# Patient Record
Sex: FEMALE | Race: WHITE
[De-identification: names, ages, dates, MRNs, and addresses within clinical notes are randomized per-mention and may not be internally consistent; named-entity substitution may affect disease eponyms.]

---

## 2017-10-25 ENCOUNTER — HOSPITAL ENCOUNTER (EMERGENCY)
Dept: HOSPITAL 8 - ED | Age: 42
Discharge: HOME | End: 2017-10-25
Payer: MEDICAID

## 2017-10-25 VITALS — DIASTOLIC BLOOD PRESSURE: 78 MMHG | SYSTOLIC BLOOD PRESSURE: 115 MMHG

## 2017-10-25 VITALS — WEIGHT: 153.22 LBS | BODY MASS INDEX: 24.05 KG/M2 | HEIGHT: 67 IN

## 2017-10-25 DIAGNOSIS — F17.200: ICD-10-CM

## 2017-10-25 DIAGNOSIS — G43.011: Primary | ICD-10-CM

## 2017-10-25 PROCEDURE — 99284 EMERGENCY DEPT VISIT MOD MDM: CPT

## 2017-10-25 PROCEDURE — 96375 TX/PRO/DX INJ NEW DRUG ADDON: CPT

## 2017-10-25 PROCEDURE — 96361 HYDRATE IV INFUSION ADD-ON: CPT

## 2017-10-25 PROCEDURE — 96374 THER/PROPH/DIAG INJ IV PUSH: CPT

## 2017-10-25 PROCEDURE — 96372 THER/PROPH/DIAG INJ SC/IM: CPT

## 2018-05-31 ENCOUNTER — HOSPITAL ENCOUNTER (OUTPATIENT)
Dept: HOSPITAL 8 - STAR | Age: 43
End: 2018-05-31
Attending: SURGERY
Payer: MEDICAID

## 2018-05-31 DIAGNOSIS — Z02.9: Primary | ICD-10-CM

## 2018-06-04 ENCOUNTER — HOSPITAL ENCOUNTER (OUTPATIENT)
Dept: HOSPITAL 8 - OUT | Age: 43
End: 2018-06-04
Attending: SURGERY
Payer: MEDICAID

## 2018-06-04 VITALS — WEIGHT: 158.73 LBS | BODY MASS INDEX: 24.91 KG/M2 | HEIGHT: 67 IN

## 2018-06-04 VITALS — SYSTOLIC BLOOD PRESSURE: 121 MMHG | DIASTOLIC BLOOD PRESSURE: 87 MMHG

## 2018-06-04 DIAGNOSIS — G43.909: ICD-10-CM

## 2018-06-04 DIAGNOSIS — Z98.890: ICD-10-CM

## 2018-06-04 DIAGNOSIS — F17.210: ICD-10-CM

## 2018-06-04 DIAGNOSIS — K43.2: Primary | ICD-10-CM

## 2018-06-04 DIAGNOSIS — Z88.5: ICD-10-CM

## 2018-06-04 DIAGNOSIS — F32.9: ICD-10-CM

## 2018-06-04 DIAGNOSIS — Z72.89: ICD-10-CM

## 2018-06-04 DIAGNOSIS — Z88.8: ICD-10-CM

## 2018-06-04 LAB — HCG UR SG: 1.02 (ref 1–1.03)

## 2018-06-04 PROCEDURE — 49568: CPT

## 2018-06-04 PROCEDURE — C1781 MESH (IMPLANTABLE): HCPCS

## 2018-06-04 PROCEDURE — 81025 URINE PREGNANCY TEST: CPT

## 2018-06-04 PROCEDURE — 49560: CPT

## 2021-07-26 ENCOUNTER — OFFICE VISIT (OUTPATIENT)
Dept: FAMILY MEDICINE CLINIC | Age: 46
End: 2021-07-26
Payer: COMMERCIAL

## 2021-07-26 VITALS
SYSTOLIC BLOOD PRESSURE: 114 MMHG | HEIGHT: 66 IN | BODY MASS INDEX: 28.77 KG/M2 | DIASTOLIC BLOOD PRESSURE: 78 MMHG | WEIGHT: 179 LBS | TEMPERATURE: 97.4 F

## 2021-07-26 DIAGNOSIS — Z01.818 PREOP EXAMINATION: ICD-10-CM

## 2021-07-26 DIAGNOSIS — M21.619 BUNION: Primary | ICD-10-CM

## 2021-07-26 PROCEDURE — 99203 OFFICE O/P NEW LOW 30 MIN: CPT | Performed by: PHYSICIAN ASSISTANT

## 2021-07-26 RX ORDER — VERAPAMIL HYDROCHLORIDE 100 MG/1
1 CAPSULE, EXTENDED RELEASE ORAL DAILY
COMMUNITY

## 2021-07-26 RX ORDER — TOPIRAMATE 100 MG/1
100 TABLET, FILM COATED ORAL DAILY
COMMUNITY

## 2021-07-26 RX ORDER — B-COMPLEX WITH VITAMIN C
TABLET ORAL 2 TIMES DAILY
COMMUNITY

## 2021-07-26 RX ORDER — DULOXETIN HYDROCHLORIDE 60 MG/1
60 CAPSULE, DELAYED RELEASE ORAL DAILY
COMMUNITY

## 2021-07-26 RX ORDER — AMITRIPTYLINE HYDROCHLORIDE 50 MG/1
50 TABLET, FILM COATED ORAL NIGHTLY
COMMUNITY

## 2021-07-26 RX ORDER — EPINEPHRINE 0.1 MG/ML
SYRINGE (ML) INJECTION PRN
COMMUNITY

## 2021-07-26 RX ORDER — DULOXETIN HYDROCHLORIDE 30 MG/1
30 CAPSULE, DELAYED RELEASE ORAL DAILY
COMMUNITY

## 2021-07-26 RX ORDER — ARIPIPRAZOLE 15 MG/1
15 TABLET ORAL DAILY
COMMUNITY

## 2021-07-26 SDOH — ECONOMIC STABILITY: FOOD INSECURITY: WITHIN THE PAST 12 MONTHS, YOU WORRIED THAT YOUR FOOD WOULD RUN OUT BEFORE YOU GOT MONEY TO BUY MORE.: NEVER TRUE

## 2021-07-26 SDOH — ECONOMIC STABILITY: FOOD INSECURITY: WITHIN THE PAST 12 MONTHS, THE FOOD YOU BOUGHT JUST DIDN'T LAST AND YOU DIDN'T HAVE MONEY TO GET MORE.: NEVER TRUE

## 2021-07-26 ASSESSMENT — PATIENT HEALTH QUESTIONNAIRE - PHQ9
2. FEELING DOWN, DEPRESSED OR HOPELESS: 0
SUM OF ALL RESPONSES TO PHQ QUESTIONS 1-9: 0
SUM OF ALL RESPONSES TO PHQ QUESTIONS 1-9: 0
1. LITTLE INTEREST OR PLEASURE IN DOING THINGS: 0
SUM OF ALL RESPONSES TO PHQ9 QUESTIONS 1 & 2: 0
DEPRESSION UNABLE TO ASSESS: FUNCTIONAL CAPACITY MOTIVATION LIMITS ACCURACY
SUM OF ALL RESPONSES TO PHQ QUESTIONS 1-9: 0

## 2021-07-26 ASSESSMENT — SOCIAL DETERMINANTS OF HEALTH (SDOH): HOW HARD IS IT FOR YOU TO PAY FOR THE VERY BASICS LIKE FOOD, HOUSING, MEDICAL CARE, AND HEATING?: NOT HARD AT ALL

## 2021-07-26 NOTE — PROGRESS NOTES
[facility]   Pre-operative History and Physical      DIAGNOSIS:     Diagnosis Orders   1. Bunion     2. Preop examination           PROCEDURE:  Right bunionectomy      History Obtained From:  patient    HISTORY OF PRESENT ILLNESS:    Kim Miller 1975 is a 50 y.o. female who presents for pre-operative evaluation. Past Medical History:    Past Medical History:   Diagnosis Date    Abnormal Pap smear of cervix     LEEP/ hormome supression/cone biposy    Anxiety     Depression     Headache      Past Surgical History:    Past Surgical History:   Procedure Laterality Date    BREAST BIOPSY Left     CYST REMOVAL N/A 1994    l index    KNEE ARTHROSCOPY Bilateral 91,92    LEEP         Current Medication  Current Outpatient Medications   Medication Sig Dispense Refill    DULoxetine (CYMBALTA) 30 MG extended release capsule Take 30 mg by mouth daily      DULoxetine (CYMBALTA) 60 MG extended release capsule Take 60 mg by mouth daily      amitriptyline (ELAVIL) 50 MG tablet Take 50 mg by mouth nightly      verapamil (VERELAN PM) 100 MG CP24 extended release capsule Take 1 capsule by mouth daily      topiramate (TOPAMAX) 100 MG tablet Take 100 mg by mouth daily       ARIPiprazole (ABILIFY) 15 MG tablet Take 15 mg by mouth daily       No current facility-administered medications for this visit. Allergies:  Bee venom, Wheat germ oil, and Codeine  History of allergic reaction to anesthesia:  No  Teeth: no dentures or caps    Social History:   Social History     Tobacco Use   Smoking Status Current Every Day Smoker    Packs/day: 0.50    Years: 20.00    Pack years: 10.00    Types: Cigarettes   Smokeless Tobacco Never Used     The patient states she drinks 3 per week.   Family History:   Family History   Problem Relation Age of Onset    Atrial Fibrillation Mother     Cancer Father         liver    Cancer Brother         breast cancer    Heart Disease Maternal Grandmother     Heart Disease Maternal Grandfather        REVIEW OF SYSTEMS:    CONSTITUTIONAL:  negative  EYES:  negative  HEENT:  negative  RESPIRATORY:  negative  CARDIOVASCULAR:  negative  GASTROINTESTINAL:  negative  GENITOURINARY:  negative  INTEGUMENT/BREAST:  negative  HEMATOLOGIC/LYMPHATIC:  negative  ALLERGIC/IMMUNOLOGIC:  negative  ENDOCRINE:  negative  MUSCULOSKELETAL:  negative  NEUROLOGICAL:  negative    PHYSICAL EXAM:      Temp 97.4 °F (36.3 °C)   Ht 5' 5.75\" (1.67 m)   Wt 179 lb (81.2 kg)   BMI 29.11 kg/m² Body mass index is 29.11 kg/m². Eyes:  Lids and lashes normal, pupils equal, round and reactive to light, extra ocular muscles intact, sclera clear, conjunctiva normal  Head/ENT:  Normocephalic, without obvious abnormality, atraumatic, sinuses nontender on palpation, external ears without lesions, oral pharynx with moist mucus membranes, tonsils without erythema or exudates, gums normal and good dentition. Neck:  Supple, symmetrical, trachea midline, no adenopathy, thyroid symmetric, not enlarged and no tenderness, skin normal  Heart:  Normal apical impulse, regular rate and rhythm, normal S1 and S2, no S3 or S4, and no murmur noted  Lungs:  No increased work of breathing, good air exchange, clear to auscultation bilaterally, no crackles or wheezing  Abdomen:  Small incisional hernia normal bowel sounds, soft, non-distended, non-tender, no masses palpated, no hepatosplenomegally  Extremities:  No clubbing, cyanosis, or edema      DATA:  No PAT required    ASSESSMENT AND PLAN:    1. Patient is a 50 y.o. female with above specified procedure planned on 7/28/21 with Dr. Jossue Burden at [facility]. 2. Stop asa and nsaid medications 7-10 days prior to procedure. Ansley Lang is Medically stable for surgery. Report will be faxed.

## 2021-07-26 NOTE — PROGRESS NOTES
Discussed need for CPE with blood work. Info given on gyn and GI to schedule/ establish. Will need to establish with psychiatry to write for medications as these I will not take over writing.

## 2021-08-30 ENCOUNTER — OFFICE VISIT (OUTPATIENT)
Dept: FAMILY MEDICINE CLINIC | Age: 46
End: 2021-08-30
Payer: COMMERCIAL

## 2021-08-30 VITALS
OXYGEN SATURATION: 98 % | BODY MASS INDEX: 28.8 KG/M2 | HEIGHT: 66 IN | WEIGHT: 179.2 LBS | DIASTOLIC BLOOD PRESSURE: 72 MMHG | SYSTOLIC BLOOD PRESSURE: 100 MMHG | HEART RATE: 106 BPM

## 2021-08-30 DIAGNOSIS — Z00.00 ANNUAL PHYSICAL EXAM: Primary | ICD-10-CM

## 2021-08-30 LAB
A/G RATIO: 1.4 (ref 1.1–2.2)
ALBUMIN SERPL-MCNC: 4 G/DL (ref 3.4–5)
ALP BLD-CCNC: 83 U/L (ref 40–129)
ALT SERPL-CCNC: 11 U/L (ref 10–40)
ANION GAP SERPL CALCULATED.3IONS-SCNC: 14 MMOL/L (ref 3–16)
AST SERPL-CCNC: 10 U/L (ref 15–37)
BILIRUB SERPL-MCNC: 0.4 MG/DL (ref 0–1)
BUN BLDV-MCNC: 12 MG/DL (ref 7–20)
CALCIUM SERPL-MCNC: 9.6 MG/DL (ref 8.3–10.6)
CHLORIDE BLD-SCNC: 104 MMOL/L (ref 99–110)
CHOLESTEROL, TOTAL: 204 MG/DL (ref 0–199)
CO2: 21 MMOL/L (ref 21–32)
CREAT SERPL-MCNC: 0.9 MG/DL (ref 0.6–1.1)
GFR AFRICAN AMERICAN: >60
GFR NON-AFRICAN AMERICAN: >60
GLOBULIN: 2.9 G/DL
GLUCOSE BLD-MCNC: 98 MG/DL (ref 70–99)
HCT VFR BLD CALC: 42.3 % (ref 36–48)
HDLC SERPL-MCNC: 35 MG/DL (ref 40–60)
HEMOGLOBIN: 14.5 G/DL (ref 12–16)
HEPATITIS C ANTIBODY INTERPRETATION: NORMAL
LDL CHOLESTEROL CALCULATED: 136 MG/DL
MCH RBC QN AUTO: 31.5 PG (ref 26–34)
MCHC RBC AUTO-ENTMCNC: 34.2 G/DL (ref 31–36)
MCV RBC AUTO: 92.1 FL (ref 80–100)
PDW BLD-RTO: 13.4 % (ref 12.4–15.4)
PLATELET # BLD: 214 K/UL (ref 135–450)
PMV BLD AUTO: 10 FL (ref 5–10.5)
POTASSIUM SERPL-SCNC: 4.2 MMOL/L (ref 3.5–5.1)
RBC # BLD: 4.59 M/UL (ref 4–5.2)
SODIUM BLD-SCNC: 139 MMOL/L (ref 136–145)
TOTAL PROTEIN: 6.9 G/DL (ref 6.4–8.2)
TRIGL SERPL-MCNC: 167 MG/DL (ref 0–150)
VLDLC SERPL CALC-MCNC: 33 MG/DL
WBC # BLD: 14.1 K/UL (ref 4–11)

## 2021-08-30 PROCEDURE — 99396 PREV VISIT EST AGE 40-64: CPT | Performed by: PHYSICIAN ASSISTANT

## 2021-08-30 PROCEDURE — 36415 COLL VENOUS BLD VENIPUNCTURE: CPT | Performed by: PHYSICIAN ASSISTANT

## 2021-08-30 PROCEDURE — 90471 IMMUNIZATION ADMIN: CPT | Performed by: PHYSICIAN ASSISTANT

## 2021-08-30 PROCEDURE — 90715 TDAP VACCINE 7 YRS/> IM: CPT | Performed by: PHYSICIAN ASSISTANT

## 2021-08-30 RX ORDER — HYDROCODONE BITARTRATE AND ACETAMINOPHEN 5; 325 MG/1; MG/1
1 TABLET ORAL EVERY 6 HOURS PRN
COMMUNITY

## 2021-08-30 NOTE — PROGRESS NOTES
History and Physical      Ritchie Woodard  YOB: 1975    Date of Service:  8/30/2021    Chief Complaint:   Ritchie Woodard is a 50 y.o. female who presents for complete physical examination. HPI: Overall doing well. Recovering from foot surgery. Wt Readings from Last 3 Encounters:   08/30/21 179 lb 3.2 oz (81.3 kg)   07/26/21 179 lb (81.2 kg)     BP Readings from Last 3 Encounters:   08/30/21 100/72   07/26/21 114/78       There is no problem list on file for this patient. Preventive Care:  Health Maintenance   Topic Date Due    Hepatitis C screen  Never done    Pneumococcal 0-64 years Vaccine (1 of 2 - PPSV23) Never done    DTaP/Tdap/Td vaccine (1 - Tdap) Never done    Cervical cancer screen  Never done    Lipid screen  Never done    Diabetes screen  Never done    Colon cancer screen colonoscopy  Never done    Flu vaccine (1) 09/01/2021    COVID-19 Vaccine  Completed    Hepatitis A vaccine  Aged Out    Hepatitis B vaccine  Aged Out    Hib vaccine  Aged Out    Meningococcal (ACWY) vaccine  Aged Out    HIV screen  Discontinued     Hx abnormal PAP: yes - last pap 2019  Sexual activity: single partner  Self-breast exams: yes  Last eye exam: 12/20  Exercise: no  Seatbelt use: yes      Immunization History   Administered Date(s) Administered    COVID-19, Moderna, PF, 100mcg/0.5mL 06/07/2021, 07/06/2021       Allergies   Allergen Reactions    Bee Venom Anaphylaxis    Wheat Germ Oil Hives    Codeine Nausea And Vomiting     Current Outpatient Medications   Medication Sig Dispense Refill    HYDROcodone-acetaminophen (NORCO) 5-325 MG per tablet Take 1 tablet by mouth every 6 hours as needed for Pain.       DULoxetine (CYMBALTA) 30 MG extended release capsule Take 30 mg by mouth daily      DULoxetine (CYMBALTA) 60 MG extended release capsule Take 60 mg by mouth daily      amitriptyline (ELAVIL) 50 MG tablet Take 50 mg by mouth nightly      verapamil (VERELAN PM) 100 MG CP24 Worried About [facility] in the Last Year: Never true    Zhang of Food in the Last Year: Never true   Transportation Needs:     Lack of Transportation (Medical):  Lack of Transportation (Non-Medical):    Physical Activity:     Days of Exercise per Week:     Minutes of Exercise per Session:    Stress:     Feeling of Stress :    Social Connections:     Frequency of Communication with Friends and Family:     Frequency of Social Gatherings with Friends and Family:     Attends Jehovah's witness Services:     Active Member of Clubs or Organizations:     Attends Club or Organization Meetings:     Marital Status:    Intimate Partner Violence:     Fear of Current or Ex-Partner:     Emotionally Abused:     Physically Abused:     Sexually Abused:        Review of Systems:  A comprehensive review of systems was negative except for what was noted in the HPI. Physical Exam:   Vitals:    08/30/21 1009   BP: 100/72   Site: Right Upper Arm   Position: Sitting   Pulse: 106   SpO2: 98%   Weight: 179 lb 3.2 oz (81.3 kg)   Height: 5' 5.75\" (1.67 m)     Body mass index is 29.14 kg/m². Constitutional: She is oriented to person, place, and time. She appears well-developed and well-nourished. No distress. HEENT:   Head: Normocephalic and atraumatic. Right Ear: Tympanic membrane, external ear and ear canal normal.   Left Ear: Tympanic membrane, external ear and ear canal normal.   Nose: Nose normal.   Mouth/Throat: Oropharynx is clear and moist, and mucous membranes are normal.  There is no cervical adenopathy. Eyes: Conjunctivae and extraocular motions are normal. Pupils are equal, round, and reactive to light. Neck: Neck supple. No JVD present. Carotid bruit is not present. No mass and no thyromegaly present. Cardiovascular: Normal rate, regular rhythm, normal heart sounds and intact distal pulses. Exam reveals no gallop and no friction rub. No murmur heard.   Pulmonary/Chest: Effort normal and breath sounds normal. No respiratory distress. She has no wheezes, rhonchi or rales. Abdominal: Soft, non-tender. Bowel sounds and aorta are normal. She exhibits no organomegaly, mass or bruit. Musculoskeletal: Normal range of motion, no synovitis. She exhibits no edema. Neurological: She is alert and oriented to person, place, and time. She has normal reflexes. No cranial nerve deficit. Coordination normal.   Skin: Skin is warm and dry. There is no rash or erythema. No suspicious lesions noted. Psychiatric: She has a normal mood and affect. Her speech is normal and behavior is normal. Judgment, cognition and memory are normal.           Assessment/Plan:         Diagnosis Orders   1. Annual physical exam  CBC    Lipid Panel    Comprehensive Metabolic Panel    Hepatitis C Antibody    Hemoglobin A1C     Will need to schedule PAP and colonoscopy. Number given.

## 2021-08-31 LAB
ESTIMATED AVERAGE GLUCOSE: 99.7 MG/DL
HBA1C MFR BLD: 5.1 %

## 2021-09-03 DIAGNOSIS — D72.829 LEUKOCYTOSIS, UNSPECIFIED TYPE: Primary | ICD-10-CM

## 2021-09-08 ENCOUNTER — NURSE ONLY (OUTPATIENT)
Dept: FAMILY MEDICINE CLINIC | Age: 46
End: 2021-09-08
Payer: COMMERCIAL

## 2021-09-08 DIAGNOSIS — D72.829 LEUKOCYTOSIS, UNSPECIFIED TYPE: ICD-10-CM

## 2021-09-08 LAB
HCT VFR BLD CALC: 42.3 % (ref 36–48)
HEMOGLOBIN: 14.2 G/DL (ref 12–16)
MCH RBC QN AUTO: 31.1 PG (ref 26–34)
MCHC RBC AUTO-ENTMCNC: 33.6 G/DL (ref 31–36)
MCV RBC AUTO: 92.6 FL (ref 80–100)
PDW BLD-RTO: 13.8 % (ref 12.4–15.4)
PLATELET # BLD: 378 K/UL (ref 135–450)
PMV BLD AUTO: 9.8 FL (ref 5–10.5)
RBC # BLD: 4.56 M/UL (ref 4–5.2)
WBC # BLD: 14.5 K/UL (ref 4–11)

## 2021-09-08 PROCEDURE — 36415 COLL VENOUS BLD VENIPUNCTURE: CPT | Performed by: PHYSICIAN ASSISTANT

## 2021-12-27 LAB — MAMMOGRAPHY, EXTERNAL: NORMAL

## 2022-01-12 ENCOUNTER — TELEPHONE (OUTPATIENT)
Dept: FAMILY MEDICINE CLINIC | Age: 47
End: 2022-01-12

## 2022-01-12 DIAGNOSIS — R92.8 MAMMOGRAM ABNORMAL: Primary | ICD-10-CM

## 2022-01-12 DIAGNOSIS — D72.829 LEUKOCYTOSIS, UNSPECIFIED TYPE: ICD-10-CM

## 2022-01-12 NOTE — TELEPHONE ENCOUNTER
[facility] already has repeat mammogram scheduled on 1/20/2022 at 9:30 AM.  Was aware of findings. Tests ordered  Also needs repeat CBC due to elevated WBCs in September.   CBC ordered

## 2022-01-12 NOTE — TELEPHONE ENCOUNTER
Left message to contact office concerning lab results. Would like to speak with her personally. Electronically signed by EASTON Adams on 1/12/2022 at 1:14 PM

## 2022-01-20 ENCOUNTER — HOSPITAL ENCOUNTER (OUTPATIENT)
Dept: WOMENS IMAGING | Age: 47
End: 2022-01-20
Payer: COMMERCIAL

## 2022-01-20 ENCOUNTER — HOSPITAL ENCOUNTER (OUTPATIENT)
Dept: WOMENS IMAGING | Age: 47
Discharge: HOME | End: 2022-01-20
Payer: COMMERCIAL

## 2022-01-20 VITALS — BODY MASS INDEX: 23.54 KG/M2 | WEIGHT: 150 LBS | HEIGHT: 67 IN

## 2022-01-20 DIAGNOSIS — R92.8 MAMMOGRAM ABNORMAL: ICD-10-CM

## 2022-01-20 PROCEDURE — G0279 TOMOSYNTHESIS, MAMMO: HCPCS

## 2022-08-30 ENCOUNTER — OFFICE VISIT (OUTPATIENT)
Dept: FAMILY MEDICINE CLINIC | Age: 47
End: 2022-08-30
Payer: COMMERCIAL

## 2022-08-30 VITALS
OXYGEN SATURATION: 97 % | DIASTOLIC BLOOD PRESSURE: 70 MMHG | HEIGHT: 67 IN | SYSTOLIC BLOOD PRESSURE: 122 MMHG | WEIGHT: 176.2 LBS | BODY MASS INDEX: 27.65 KG/M2 | TEMPERATURE: 97.8 F | HEART RATE: 92 BPM

## 2022-08-30 DIAGNOSIS — G43.719 INTRACTABLE CHRONIC MIGRAINE WITHOUT AURA AND WITHOUT STATUS MIGRAINOSUS: ICD-10-CM

## 2022-08-30 DIAGNOSIS — K43.2 INCISIONAL HERNIA, WITHOUT OBSTRUCTION OR GANGRENE: ICD-10-CM

## 2022-08-30 DIAGNOSIS — Z00.00 ANNUAL PHYSICAL EXAM: Primary | ICD-10-CM

## 2022-08-30 LAB
A/G RATIO: 1.8 (ref 1.1–2.2)
ALBUMIN SERPL-MCNC: 4.5 G/DL (ref 3.4–5)
ALP BLD-CCNC: 55 U/L (ref 40–129)
ALT SERPL-CCNC: 10 U/L (ref 10–40)
ANION GAP SERPL CALCULATED.3IONS-SCNC: 12 MMOL/L (ref 3–16)
AST SERPL-CCNC: 13 U/L (ref 15–37)
BILIRUB SERPL-MCNC: 0.5 MG/DL (ref 0–1)
BUN BLDV-MCNC: 12 MG/DL (ref 7–20)
CALCIUM SERPL-MCNC: 9.8 MG/DL (ref 8.3–10.6)
CHLORIDE BLD-SCNC: 107 MMOL/L (ref 99–110)
CHOLESTEROL, TOTAL: 249 MG/DL (ref 0–199)
CO2: 21 MMOL/L (ref 21–32)
CREAT SERPL-MCNC: 0.8 MG/DL (ref 0.6–1.1)
GFR AFRICAN AMERICAN: >60
GFR NON-AFRICAN AMERICAN: >60
GLUCOSE BLD-MCNC: 86 MG/DL (ref 70–99)
HDLC SERPL-MCNC: 45 MG/DL (ref 40–60)
LDL CHOLESTEROL CALCULATED: 176 MG/DL
POTASSIUM SERPL-SCNC: 4.3 MMOL/L (ref 3.5–5.1)
SODIUM BLD-SCNC: 140 MMOL/L (ref 136–145)
TOTAL PROTEIN: 7 G/DL (ref 6.4–8.2)
TRIGL SERPL-MCNC: 139 MG/DL (ref 0–150)
VLDLC SERPL CALC-MCNC: 28 MG/DL

## 2022-08-30 PROCEDURE — 99396 PREV VISIT EST AGE 40-64: CPT | Performed by: PHYSICIAN ASSISTANT

## 2022-08-30 PROCEDURE — 36415 COLL VENOUS BLD VENIPUNCTURE: CPT | Performed by: PHYSICIAN ASSISTANT

## 2022-08-30 PROCEDURE — 99213 OFFICE O/P EST LOW 20 MIN: CPT | Performed by: PHYSICIAN ASSISTANT

## 2022-08-30 RX ORDER — ERENUMAB-AOOE 70 MG/ML
70 INJECTION SUBCUTANEOUS
Qty: 3 PEN | Refills: 0 | Status: SHIPPED | OUTPATIENT
Start: 2022-08-30

## 2022-08-30 SDOH — ECONOMIC STABILITY: FOOD INSECURITY: WITHIN THE PAST 12 MONTHS, THE FOOD YOU BOUGHT JUST DIDN'T LAST AND YOU DIDN'T HAVE MONEY TO GET MORE.: NEVER TRUE

## 2022-08-30 SDOH — ECONOMIC STABILITY: FOOD INSECURITY: WITHIN THE PAST 12 MONTHS, YOU WORRIED THAT YOUR FOOD WOULD RUN OUT BEFORE YOU GOT MONEY TO BUY MORE.: NEVER TRUE

## 2022-08-30 ASSESSMENT — PATIENT HEALTH QUESTIONNAIRE - PHQ9
2. FEELING DOWN, DEPRESSED OR HOPELESS: 0
SUM OF ALL RESPONSES TO PHQ QUESTIONS 1-9: 0
10. IF YOU CHECKED OFF ANY PROBLEMS, HOW DIFFICULT HAVE THESE PROBLEMS MADE IT FOR YOU TO DO YOUR WORK, TAKE CARE OF THINGS AT HOME, OR GET ALONG WITH OTHER PEOPLE: 0
3. TROUBLE FALLING OR STAYING ASLEEP: 0
8. MOVING OR SPEAKING SO SLOWLY THAT OTHER PEOPLE COULD HAVE NOTICED. OR THE OPPOSITE, BEING SO FIGETY OR RESTLESS THAT YOU HAVE BEEN MOVING AROUND A LOT MORE THAN USUAL: 0
SUM OF ALL RESPONSES TO PHQ QUESTIONS 1-9: 0
7. TROUBLE CONCENTRATING ON THINGS, SUCH AS READING THE NEWSPAPER OR WATCHING TELEVISION: 0
SUM OF ALL RESPONSES TO PHQ QUESTIONS 1-9: 0
9. THOUGHTS THAT YOU WOULD BE BETTER OFF DEAD, OR OF HURTING YOURSELF: 0
5. POOR APPETITE OR OVEREATING: 0
4. FEELING TIRED OR HAVING LITTLE ENERGY: 0
SUM OF ALL RESPONSES TO PHQ9 QUESTIONS 1 & 2: 0
6. FEELING BAD ABOUT YOURSELF - OR THAT YOU ARE A FAILURE OR HAVE LET YOURSELF OR YOUR FAMILY DOWN: 0
SUM OF ALL RESPONSES TO PHQ QUESTIONS 1-9: 0
1. LITTLE INTEREST OR PLEASURE IN DOING THINGS: 0

## 2022-08-30 ASSESSMENT — SOCIAL DETERMINANTS OF HEALTH (SDOH): HOW HARD IS IT FOR YOU TO PAY FOR THE VERY BASICS LIKE FOOD, HOUSING, MEDICAL CARE, AND HEATING?: NOT HARD AT ALL

## 2022-08-30 NOTE — PROGRESS NOTES
mcg/0.5mL 06/07/2021, 07/06/2021    Tdap (Boostrix, Adacel) 08/30/2021       Allergies   Allergen Reactions    Bee Venom Anaphylaxis    Wheat Germ Oil Hives    Codeine Nausea And Vomiting     Current Outpatient Medications   Medication Sig Dispense Refill    HYDROcodone-acetaminophen (NORCO) 5-325 MG per tablet Take 1 tablet by mouth every 6 hours as needed for Pain. DULoxetine (CYMBALTA) 30 MG extended release capsule Take 30 mg by mouth daily      DULoxetine (CYMBALTA) 60 MG extended release capsule Take 60 mg by mouth daily      amitriptyline (ELAVIL) 50 MG tablet Take 50 mg by mouth nightly      verapamil (VERELAN PM) 100 MG CP24 extended release capsule Take 1 capsule by mouth daily      topiramate (TOPAMAX) 100 MG tablet Take 100 mg by mouth daily       ARIPiprazole (ABILIFY) 15 MG tablet Take 15 mg by mouth daily      calcium carbonate-vitamin D 600-200 MG-UNIT TABS Take by mouth 2 times daily      EPINEPHrine 1 MG/10ML SOSY injection as needed       No current facility-administered medications for this visit.        Past Medical History:   Diagnosis Date    Abnormal Pap smear of cervix     LEEP/ hormome supression/cone biposy    Anxiety     Depression     Headache      Past Surgical History:   Procedure Laterality Date    BREAST BIOPSY Left     BREAST LUMPECTOMY      CYST REMOVAL N/A 1994    l index    KNEE ARTHROSCOPY Bilateral 91,92    LEEP      SMALL INTESTINE SURGERY  2015    partial     Family History   Problem Relation Age of Onset    Atrial Fibrillation Mother     Cancer Father         liver    Cancer Brother         breast cancer    Heart Disease Maternal Grandmother     Heart Disease Maternal Grandfather     Breast Cancer Maternal Great Grandmother      Social History     Socioeconomic History    Marital status: Single     Spouse name: Not on file    Number of children: Not on file    Years of education: Not on file    Highest education level: Not on file   Occupational History    Not on file Tobacco Use    Smoking status: Every Day     Packs/day: 0.50     Years: 20.00     Pack years: 10.00     Types: Cigarettes    Smokeless tobacco: Never   Substance and Sexual Activity    Alcohol use: Yes     Alcohol/week: 3.0 standard drinks     Types: 3 Glasses of wine per week    Drug use: Yes     Types: Marijuana Leeann Garfield)    Sexual activity: Yes     Partners: Male   Other Topics Concern    Not on file   Social History Narrative    Not on file     Social Determinants of Health     Financial Resource Strain: Low Risk     Difficulty of Paying Living Expenses: Not hard at all   Food Insecurity: No Food Insecurity    Worried About Running Out of Food in the Last Year: Never true    Ran Out of Food in the Last Year: Never true   Transportation Needs: Not on file   Physical Activity: Not on file   Stress: Not on file   Social Connections: Not on file   Intimate Partner Violence: Not on file   Housing Stability: Not on file       Review of Systems:  A comprehensive review of systems was negative except for what was noted in the HPI. Physical Exam:   Vitals:    08/30/22 0919   BP: 122/70   Pulse: 92   Temp: 97.8 °F (36.6 °C)   TempSrc: Temporal   SpO2: 97%   Weight: 176 lb 3.2 oz (79.9 kg)   Height: 5' 7\" (1.702 m)     Body mass index is 27.6 kg/m². Constitutional: She is oriented to person, place, and time. She appears well-developed and well-nourished. No distress. HEENT:   Head: Normocephalic and atraumatic. Right Ear: Tympanic membrane, external ear and ear canal normal.   Left Ear: Tympanic membrane, external ear and ear canal normal.   Nose: Nose normal.   Mouth/Throat: Oropharynx is clear and moist, and mucous membranes are normal.  There is no cervical adenopathy. Eyes: Conjunctivae and extraocular motions are normal. Pupils are equal, round, and reactive to light. Neck: Neck supple. No JVD present. Carotid bruit is not present. No mass and no thyromegaly present.    Cardiovascular: Normal rate, regular rhythm, normal heart sounds and intact distal pulses. Exam reveals no gallop and no friction rub. No murmur heard. Pulmonary/Chest: Effort normal and breath sounds normal. No respiratory distress. She has no wheezes, rhonchi or rales. Abdominal: Soft, non-tender. Bowel sounds and aorta are normal. She exhibits no organomegaly, mass or bruit. Incision hernia noted  Musculoskeletal: Normal range of motion, no synovitis. She exhibits no edema. Neurological: She is alert and oriented to person, place, and time. She has normal reflexes. No cranial nerve deficit. Coordination normal.   Skin: Skin is warm and dry. There is no rash or erythema. No suspicious lesions noted. Psychiatric: She has a normal mood and affect. Her speech is normal and behavior is normal. Judgment, cognition and memory are normal.           Assessment/Plan:       Diagnosis Orders   1. Annual physical exam  Lipid Panel    Comprehensive Metabolic Panel      2. Incisional hernia, without obstruction or gangrene  Jojo Savage MD, General Surgery, [facility]      3. Intractable chronic migraine without aura and without status migrainosus  Will start aimovig 70mg monthly.    Follow up in 3 months or sooner if needed

## 2022-09-19 ENCOUNTER — TELEPHONE (OUTPATIENT)
Dept: ADMINISTRATIVE | Age: 47
End: 2022-09-19

## 2023-02-01 NOTE — TELEPHONE ENCOUNTER
Shantelle Stout is requesting refill(s) Aimovig  Last OV 8-30-22 (pertaining to medication)  LR 8-30-22  #3 pens (per medication requested)  Next office visit scheduled or attempted No

## 2023-02-03 RX ORDER — ERENUMAB-AOOE 70 MG/ML
INJECTION SUBCUTANEOUS
Qty: 3 ML | Refills: 0 | Status: SHIPPED | OUTPATIENT
Start: 2023-02-03

## 2023-05-26 ENCOUNTER — OFFICE VISIT (OUTPATIENT)
Dept: FAMILY MEDICINE CLINIC | Age: 48
End: 2023-05-26
Payer: COMMERCIAL

## 2023-05-26 VITALS
DIASTOLIC BLOOD PRESSURE: 84 MMHG | HEART RATE: 103 BPM | OXYGEN SATURATION: 97 % | BODY MASS INDEX: 27.6 KG/M2 | HEIGHT: 67 IN | SYSTOLIC BLOOD PRESSURE: 128 MMHG | RESPIRATION RATE: 18 BRPM

## 2023-05-26 DIAGNOSIS — S39.012A STRAIN OF LUMBAR REGION, INITIAL ENCOUNTER: Primary | ICD-10-CM

## 2023-05-26 PROCEDURE — 99213 OFFICE O/P EST LOW 20 MIN: CPT | Performed by: PHYSICIAN ASSISTANT

## 2023-05-26 RX ORDER — METHYLPREDNISOLONE 4 MG/1
TABLET ORAL
Qty: 1 KIT | Refills: 0 | Status: SHIPPED | OUTPATIENT
Start: 2023-05-26 | End: 2023-06-01

## 2023-05-26 RX ORDER — METHOCARBAMOL 500 MG/1
500 TABLET, FILM COATED ORAL 4 TIMES DAILY
Qty: 40 TABLET | Refills: 0 | Status: SHIPPED | OUTPATIENT
Start: 2023-05-26 | End: 2023-06-05

## 2023-05-26 ASSESSMENT — PATIENT HEALTH QUESTIONNAIRE - PHQ9
8. MOVING OR SPEAKING SO SLOWLY THAT OTHER PEOPLE COULD HAVE NOTICED. OR THE OPPOSITE, BEING SO FIGETY OR RESTLESS THAT YOU HAVE BEEN MOVING AROUND A LOT MORE THAN USUAL: 0
6. FEELING BAD ABOUT YOURSELF - OR THAT YOU ARE A FAILURE OR HAVE LET YOURSELF OR YOUR FAMILY DOWN: 0
SUM OF ALL RESPONSES TO PHQ QUESTIONS 1-9: 3
2. FEELING DOWN, DEPRESSED OR HOPELESS: 0
SUM OF ALL RESPONSES TO PHQ9 QUESTIONS 1 & 2: 0
3. TROUBLE FALLING OR STAYING ASLEEP: 0
7. TROUBLE CONCENTRATING ON THINGS, SUCH AS READING THE NEWSPAPER OR WATCHING TELEVISION: 0
SUM OF ALL RESPONSES TO PHQ QUESTIONS 1-9: 3
SUM OF ALL RESPONSES TO PHQ QUESTIONS 1-9: 3
1. LITTLE INTEREST OR PLEASURE IN DOING THINGS: 0
5. POOR APPETITE OR OVEREATING: 0
4. FEELING TIRED OR HAVING LITTLE ENERGY: 3
9. THOUGHTS THAT YOU WOULD BE BETTER OFF DEAD, OR OF HURTING YOURSELF: 0
10. IF YOU CHECKED OFF ANY PROBLEMS, HOW DIFFICULT HAVE THESE PROBLEMS MADE IT FOR YOU TO DO YOUR WORK, TAKE CARE OF THINGS AT HOME, OR GET ALONG WITH OTHER PEOPLE: 0
SUM OF ALL RESPONSES TO PHQ QUESTIONS 1-9: 3

## 2023-05-26 ASSESSMENT — ENCOUNTER SYMPTOMS
BACK PAIN: 1
RESPIRATORY NEGATIVE: 1

## 2023-05-26 NOTE — PROGRESS NOTES
Subjective:      Patient ID: Daniel Merrill is a 50 y.o. female. HPI    Patient presents today with low back pain that started on Wednesday after bending over to dry off her legs. She has been taking lidocaine cream, hot showers, and advil with no real relief. The pain does radiate some into the gluteal slightly more on right that left. Worse with bending over. Is better if lying flat. Review of Systems   Constitutional: Negative. Respiratory: Negative. Cardiovascular: Negative. Musculoskeletal:  Positive for back pain. Objective:   Physical Exam  Constitutional:       Appearance: Normal appearance. Cardiovascular:      Rate and Rhythm: Normal rate and regular rhythm. Pulmonary:      Effort: Pulmonary effort is normal.   Musculoskeletal:      Lumbar back: Spasms and tenderness present. Decreased range of motion. Positive right straight leg raise test and positive left straight leg raise test.   Neurological:      Mental Status: She is alert. Assessment / Plan:          Diagnosis Orders   1. Strain of lumbar region, initial encounter  Will start medrol dose pack and robaxin, rest, ice, avoid any lifting. Patient is to call if no improvement or signs and symptoms worsen.

## 2023-07-07 ENCOUNTER — OFFICE VISIT (OUTPATIENT)
Dept: FAMILY MEDICINE CLINIC | Age: 48
End: 2023-07-07
Payer: COMMERCIAL

## 2023-07-07 VITALS
SYSTOLIC BLOOD PRESSURE: 108 MMHG | DIASTOLIC BLOOD PRESSURE: 78 MMHG | WEIGHT: 146.2 LBS | OXYGEN SATURATION: 97 % | HEIGHT: 67 IN | BODY MASS INDEX: 22.95 KG/M2 | HEART RATE: 115 BPM

## 2023-07-07 DIAGNOSIS — M54.50 LUMBAR PAIN: Primary | ICD-10-CM

## 2023-07-07 PROCEDURE — 99213 OFFICE O/P EST LOW 20 MIN: CPT | Performed by: PHYSICIAN ASSISTANT

## 2023-07-07 RX ORDER — CYCLOBENZAPRINE HCL 10 MG
10 TABLET ORAL 3 TIMES DAILY PRN
Qty: 21 TABLET | Refills: 0 | Status: SHIPPED | OUTPATIENT
Start: 2023-07-07 | End: 2023-07-17

## 2023-07-07 SDOH — ECONOMIC STABILITY: INCOME INSECURITY: HOW HARD IS IT FOR YOU TO PAY FOR THE VERY BASICS LIKE FOOD, HOUSING, MEDICAL CARE, AND HEATING?: NOT HARD AT ALL

## 2023-07-07 SDOH — ECONOMIC STABILITY: FOOD INSECURITY: WITHIN THE PAST 12 MONTHS, YOU WORRIED THAT YOUR FOOD WOULD RUN OUT BEFORE YOU GOT MONEY TO BUY MORE.: NEVER TRUE

## 2023-07-07 SDOH — ECONOMIC STABILITY: HOUSING INSECURITY
IN THE LAST 12 MONTHS, WAS THERE A TIME WHEN YOU DID NOT HAVE A STEADY PLACE TO SLEEP OR SLEPT IN A SHELTER (INCLUDING NOW)?: NO

## 2023-07-07 SDOH — ECONOMIC STABILITY: FOOD INSECURITY: WITHIN THE PAST 12 MONTHS, THE FOOD YOU BOUGHT JUST DIDN'T LAST AND YOU DIDN'T HAVE MONEY TO GET MORE.: NEVER TRUE

## 2023-07-07 ASSESSMENT — ENCOUNTER SYMPTOMS
RESPIRATORY NEGATIVE: 1
BACK PAIN: 1

## 2023-07-07 NOTE — PROGRESS NOTES
Subjective:      Patient ID: Frankey Six is a 50 y.o. female. HPI  Patient presents with continued right sided low back pain. It is definitely better but still present. Can radiate to mid thigh, not sciatica as she has had that before and that is different. The medrol dose pack helped but temporary. Did not feel the robaxin did anything. Review of Systems   Constitutional: Negative. Respiratory: Negative. Cardiovascular: Negative. Musculoskeletal:  Positive for back pain. Objective:   Physical Exam  Constitutional:       Appearance: Normal appearance. Musculoskeletal:         General: Tenderness present. Comments: Right lower lumbar paraspinal TTP   Neurological:      General: No focal deficit present. Mental Status: She is alert and oriented to person, place, and time. Assessment / Plan:          Diagnosis Orders   1.  Lumbar pain  [facility]

## 2023-07-18 ENCOUNTER — TELEPHONE (OUTPATIENT)
Dept: FAMILY MEDICINE CLINIC | Age: 48
End: 2023-07-18

## 2023-07-18 DIAGNOSIS — T78.40XA ALLERGIC REACTION, INITIAL ENCOUNTER: Primary | ICD-10-CM

## 2023-07-18 NOTE — TELEPHONE ENCOUNTER
Pt had hives all over body and blushed skin x5 days ago. Pt states she toke Benadryl and hives are gone. Pt wanted referral to allergist to get allergies tested. Does pt need to be seen for this or can referral be sent without being seen?

## 2023-07-19 NOTE — TELEPHONE ENCOUNTER
Patient states she has had issues in the past and knows some of the things she is allergic to but does not know everything and thinks this would be helpful, she is ok to see whoever Melony Alvares recommends.

## 2023-07-20 NOTE — TELEPHONE ENCOUNTER
Patient was advised and given the name and phone number for [facility] allergy, she will call to schedule, referral has been faxed.

## 2023-07-27 ENCOUNTER — HOSPITAL ENCOUNTER (OUTPATIENT)
Dept: PHYSICAL THERAPY | Age: 48
Setting detail: THERAPIES SERIES
Discharge: HOME | End: 2023-07-27
Payer: COMMERCIAL

## 2023-07-27 DIAGNOSIS — M54.50 BILATERAL LOW BACK PAIN WITHOUT SCIATICA, UNSPECIFIED CHRONICITY: Primary | ICD-10-CM

## 2023-07-27 DIAGNOSIS — R29.898 DECREASED STRENGTH OF LOWER EXTREMITY: ICD-10-CM

## 2023-07-27 PROCEDURE — 97161 PT EVAL LOW COMPLEX 20 MIN: CPT | Performed by: PHYSICAL THERAPIST

## 2023-07-27 PROCEDURE — 97110 THERAPEUTIC EXERCISES: CPT | Performed by: PHYSICAL THERAPIST

## 2023-07-27 NOTE — FLOWSHEET NOTE
[address] [place] [address], [address] [place] [address]  Phone: [phone]  Fax [phone]     Physical Therapy Treatment Note/ Progress Report:         Physical Therapy: TREATMENT/PROGRESS NOTE   Patient: Leesa Forbes (57 y.o. female)   Examination Date: 2023   :  1975 MRN: [id number]   Visit #: 1    Insurance: Payor: [facility] / Plan: BCBS - [place] PPO / Product Type: *No Product type* / 30 visits per year, 30% co ins  Insurance ID: [id number] - ([id number])  Secondary Insurance (if applicable):    Treatment Diagnosis:      ICD-10-CM    1. Bilateral low back pain without sciatica, unspecified chronicity  M54.50       2.  Decreased strength of lower extremity  R29.898          Medical Diagnosis: Lumbar pain [M54.50]        Referring Physician: EASTON Salmon  PCP: EASTON Salmon                             Plan of care signed (Y/N):     Date of Patient follow up with Physician:      Progress Report: EVAL today    Progress report due (10 Rx/or 30 days whichever is less):      Recertification due (POC duration/ or 90 days whichever is less): 23     Visit # Insurance Allowable Auth Needed   1 30 [x]Yes    []No     Latex Allergy:  [x]NO      []YES  Preferred Language for Healthcare:   [x]English       []other:        SUBJECTIVE EXAMINATION     Pain level:   6/10  Location: mostly over right PSIS  SUBJECTIVE:  See eval      OBJECTIVE EXAMINATION     OBJECTIVE: see eval  Observation:   Test measurements:       Test used Initial score  23    Pain Summary VAS     Functional questionnaire Oswestry     ROM Trunk ROM WNL all directions          Hamstring flexibility  Mod dec B          Strength                            RESTRICTIONS/PRECAUTIONS: prio history of cervical cancer (clear currently)    Exercises/Interventions:     Therapeutic Ex ([id number])   Min: Activity specifics Notes/CUES   Longsit ham stretch between chairs

## 2023-07-27 NOTE — THERAPY EVALUATION
Co-morbidities/Complexities (which will affect course of rehabilitation):  []None         []Hx of COVID  [x]Other:  MIGRAINES   Arthritic conditions   []Rheumatoid arthritis (M05.9)  []Osteoarthritis (M19.91)  []Gout    Cardiovascular conditions   []Hypertension (I10)  []Hyperlipidemia (E78.5)  []Angina pectoris (I20)  []Atherosclerosis (I70)  []Pacemaker/Defib  []Hx of CABG/stent/  cardiac surgeries    Musculoskeletal conditions   []Disc pathology   []Congenital spine pathologies   []Osteoporosis (M81.8)  []Osteopenia (M85.8)  []Scoliosis       Prior surgeries  []involved limb  []previous spinal surgery  [] section birth  []hysterectomy  []bowel / bladder surgery  []other relevant surgeries   Endocrine conditions   []Hypothyroid (E03.9)  []Hyperthyroid          Cardio/Pulmonary conditions   []Asthma (J45)  []Coughing   []COPD (J44.9)  []CHF  []A-fib  Neurological conditions  []Prior Stroke (I69.30)  []Parkinson's (G20)  []Encephalopathy (G93.40)  []MS (G35)  []Post-polio (G14)  []SCI  []TBI  []ALS Other conditions    []Vertigo  []Syncope  []Kidney Failure  [x]Cancer: cervical (currently clear)  []Pregnancy  []Incontinence   Metabolic conditions   []Morbid obesity (E66.01)  []Diabetes type 1(E10.65) or 2 (E11.65)   []Neuropathy (G60.9)    Psychological Disorders  [x]Anxiety (F41.9)  [x]Depression (F32.9)   []Bipolar  []Other:    Developmental Disorders  []Autism (F84.0)  []CP (G80)  []Down Syndrome (Q90.9)  []Developmental delay    Gastrointestinal conditions   []Constipation (K59.00)  []Chron's/ulcerative colitis   Rheumatological conditions  []Fibromyalgia (M79.7)  []Lupus  []Sjogrens  []Ankylosing spondylitis  []Other autoimmune             Barriers to/and or personal factors that will affect rehab potential:   None noted  Co-morbidities  Justification: patient has had several hernia surgeries that may limit abdominal control of lumbar spine.   He is motivated, so should do well        ASSESSMENT

## 2023-08-03 ENCOUNTER — HOSPITAL ENCOUNTER (OUTPATIENT)
Dept: PHYSICAL THERAPY | Age: 48
Setting detail: THERAPIES SERIES
Discharge: HOME | End: 2023-08-03

## 2023-08-03 NOTE — FLOWSHEET NOTE
[facility], [address] [place] [address]  [address] [place] [address]        Physical Therapy  Cancellation/No-show Note  Patient Name:  Leesa Forbes  :  1975   Date:  8/3/2023  Cancelled visits to date: 0  No-shows to date: 0    For today's appointment patient:  []  Cancelled  []  Rescheduled appointment  [x]  No-show     Reason given by patient:  []  Patient ill  []  Conflicting appointment  []  No transportation    []  Conflict with work  []  No reason given  []  Other:     Comments:      Electronically signed by:  Nawaf Delacruz PT

## 2023-08-09 ENCOUNTER — HOSPITAL ENCOUNTER (OUTPATIENT)
Dept: PHYSICAL THERAPY | Age: 48
Setting detail: THERAPIES SERIES
Discharge: HOME | End: 2023-08-09
Payer: COMMERCIAL

## 2023-08-09 PROCEDURE — 97112 NEUROMUSCULAR REEDUCATION: CPT | Performed by: PHYSICAL THERAPIST

## 2023-08-09 PROCEDURE — 97140 MANUAL THERAPY 1/> REGIONS: CPT | Performed by: PHYSICAL THERAPIST

## 2023-08-09 PROCEDURE — 97110 THERAPEUTIC EXERCISES: CPT | Performed by: PHYSICAL THERAPIST

## 2023-08-09 NOTE — FLOWSHEET NOTE
[address] [place] [address] and [facility], [address] [place] [address]  [address] [facility], [address] [place] [address], [address] [place] [address]  Phone: [phone]  Fax [phone]     Physical Therapy Treatment Note/ Progress Report:         Physical Therapy: TREATMENT/PROGRESS NOTE   Patient: Tenisha Fournier (82 y.o. female)   Examination Date: 2023   :  1975 MRN: [id number]   Visit #: 2    Insurance: Payor: Charissa Riggins / Plan: Charissa Riggins - [place] PPO / Product Type: *No Product type* / 30 visits per year, 30% co ins  Insurance ID: [id number] - ([id number])  Secondary Insurance (if applicable):    Treatment Diagnosis:      ICD-10-CM    1. Bilateral low back pain without sciatica, unspecified chronicity  M54.50       2. Decreased strength of lower extremity  R29.898          Medical Diagnosis: Lumbar pain [M54.50]        Referring Physician: EASTON Dahl  PCP: EASTON Dahl                             Plan of care signed (Y/N):     Date of Patient follow up with Physician:      Progress Report: EVAL today    Progress report due (10 Rx/or 30 days whichever is less):      Recertification due (POC duration/ or 90 days whichever is less): 23     Visit # Insurance Allowable Auth Needed   2 30 [x]Yes    []No     Latex Allergy:  [x]NO      []YES  Preferred Language for Healthcare:   [x]English       []other:        SUBJECTIVE EXAMINATION     Pain level:   4-5/10  Location: mostly over right PSIS  SUBJECTIVE:  Reports slightly better overall since initial visit. Pain on Right side stays the same. Has L sided pain when waking up but goes away after moving around. OBJECTIVE EXAMINATION     OBJECTIVE: see eval  Observation:   Test measurements:  + standing flexion test and supine to sit test.  Increased TTP R piriformis.  Glut med: R 3+/5, L 4-/5     Test used Initial score  23    Pain Summary VAS     Functional questionnaire Oswestry     ROM Trunk ROM WNL all directions          Hamstring flexibility  Mod dec B

## 2023-08-11 ENCOUNTER — HOSPITAL ENCOUNTER (OUTPATIENT)
Dept: PHYSICAL THERAPY | Age: 48
Setting detail: THERAPIES SERIES
Discharge: HOME | End: 2023-08-11
Payer: COMMERCIAL

## 2023-08-11 PROCEDURE — 97140 MANUAL THERAPY 1/> REGIONS: CPT | Performed by: PHYSICAL THERAPIST

## 2023-08-11 PROCEDURE — 97110 THERAPEUTIC EXERCISES: CPT | Performed by: PHYSICAL THERAPIST

## 2023-08-11 NOTE — FLOWSHEET NOTE
minutes face-to-face)  [] EVAL (MOD) 80299 (typically 30 minutes face-to-face)  [] EVAL (HIGH) 87704 (typically 45 minutes face-to-face)  [] RE-EVAL     [x] ZG(54974) x   1  [] DRY NEEDLE 1 OR 2 MUSCLES  [] NMR (81326) x     [] DRY NEEDLE 3+ MUSCLES  [x] Manual (58261) x 1      [] TA (10384) x     [] Mech Traction (35681)  [] ES(attended) (07332)     [] ES (un) (13675):   [] VASO (16956)  [] Other:  Patient signed estimate for 2 units  GOALS   GOALS:  Patient stated goal: eliminate pain  Status:SET     Therapist goals for Patient:   Short Term Goals: To be achieved in: 2 weeks  Independent in HEP and progression per patient tolerance, in order to progress toward full function and prevent re-injury. Status:SET   Patient will have a decrease in pain to 2/10 to help  facilitate improvement in movement, function, and ADLs as indicated by functional deficits. Status: SET     Long Term Goals: To be achieved in: 4-6 weeks  Disability index score of 22% or less for the Modified Oswestry to assist with return top prior level of function. Status: SET   Improve ROM to WNL to allow for proper joint functioning as indicated by patients functional deficits. Status: SET   Pt to improve strength to 4+/5 or better of proximal hip and TrA/abdominal to allow for proper muscle and joint use in functional mobility, ADLs and prior level of function   Status: SET   Patient will return to  lifting an object from the floor and sit for length of time  without increased symptoms or restriction to work towards return to prior level of function. Status: SET   Patient will resume normal work/leisure activities without pain. Status: SET        TREATMENT PLAN   Plan: Continue emphasis/focus on exercise progression, improving proper muscle recruitment and activation/motor control patterns, modulating pain, and increasing ROM.  Next visit plan to progress reps add new exercises look to Dry Needle or other manual

## 2023-08-15 ENCOUNTER — HOSPITAL ENCOUNTER (OUTPATIENT)
Dept: PHYSICAL THERAPY | Age: 48
Setting detail: THERAPIES SERIES
Discharge: HOME | End: 2023-08-15
Payer: COMMERCIAL

## 2023-08-15 PROCEDURE — 97110 THERAPEUTIC EXERCISES: CPT

## 2023-08-15 PROCEDURE — 97140 MANUAL THERAPY 1/> REGIONS: CPT

## 2023-08-15 NOTE — FLOWSHEET NOTE
[address] [place] [address], [address] [place] [address]  [address] [place] [address], [address] [place] [address]  Phone: [phone]  Fax [phone]     Physical Therapy Treatment Note/ Progress Report:         Physical Therapy: TREATMENT/PROGRESS NOTE   Patient: Frankey Six (75 y.o. female)   Examination Date: 08/15/2023   :  1975 MRN: [id number]   Visit #: 4    Insurance: Payor: Syl Mehta / Plan: [facility] - [place] PPO / Product Type: *No Product type* / 30 visits per year, 30% co ins  Insurance ID: [id number] - ([facility])  Secondary Insurance (if applicable):    Treatment Diagnosis:      ICD-10-CM    1. Bilateral low back pain without sciatica, unspecified chronicity  M54.50       2. Decreased strength of lower extremity  R29.898          Medical Diagnosis: Lumbar pain [M54.50]        Referring Physician: EASTON Ibrahim  PCP: EASTON Ibrahim                             Plan of care signed (Y/N):     Date of Patient follow up with Physician:      Progress Report: EVAL today    Progress report due (10 Rx/or 30 days whichever is less):      Recertification due (POC duration/ or 90 days whichever is less): 23     Visit # Insurance Allowable Auth Needed   4 30 [x]Yes    []No     Latex Allergy:  [x]NO      []YES  Preferred Language for Healthcare:   [x]English       []other:        SUBJECTIVE EXAMINATION     Pain level:   4-5/10  Location: mostly over right PSIS  SUBJECTIVE:  Pt states she does feel that she has had made progress with pain and mobility since starting PT. She is compliant with PT. She still feels most pain is in R SI and upper glute.        OBJECTIVE EXAMINATION     OBJECTIVE: Observation: possible mobile fibromas (?) palpated in lumbosacral region and QLs-asked pt to mention to her PCP and she may benefit from referral to dermatology if these areas get larger or painful  Test measurements:  + standing flexion test and supine to sit test.  Increased TTP R glute min, med

## 2023-08-18 ENCOUNTER — APPOINTMENT (OUTPATIENT)
Dept: PHYSICAL THERAPY | Age: 48
End: 2023-08-18
Payer: COMMERCIAL

## 2023-08-29 ENCOUNTER — HOSPITAL ENCOUNTER (OUTPATIENT)
Dept: PHYSICAL THERAPY | Age: 48
Setting detail: THERAPIES SERIES
Discharge: HOME | End: 2023-08-29
Payer: COMMERCIAL

## 2023-08-29 NOTE — FLOWSHEET NOTE
[address]  [address] [place] [address]        Physical Therapy  Cancellation/No-show Note  Patient Name:  Dietra Cowden  :  1975   Date:  2023  Cancelled visits to date: 1  No-shows to date: 1    For today's appointment patient:  [x]  Cancelled  []  Rescheduled appointment  []  No-show     Reason given by patient:  [x]  Patient ill  []  Conflicting appointment  []  No transportation    []  Conflict with work  []  No reason given  []  Other:     Comments:  migraine    Electronically signed by:  Gregory Duran PT

## 2023-09-15 ENCOUNTER — OFFICE VISIT (OUTPATIENT)
Dept: FAMILY MEDICINE CLINIC | Age: 48
End: 2023-09-15
Payer: COMMERCIAL

## 2023-09-15 VITALS
SYSTOLIC BLOOD PRESSURE: 122 MMHG | DIASTOLIC BLOOD PRESSURE: 80 MMHG | WEIGHT: 141.4 LBS | OXYGEN SATURATION: 99 % | HEART RATE: 90 BPM | BODY MASS INDEX: 22.73 KG/M2 | RESPIRATION RATE: 18 BRPM | HEIGHT: 66 IN

## 2023-09-15 DIAGNOSIS — Z00.00 ANNUAL PHYSICAL EXAM: Primary | ICD-10-CM

## 2023-09-15 DIAGNOSIS — Z12.11 COLON CANCER SCREENING: ICD-10-CM

## 2023-09-15 PROBLEM — G43.909 MIGRAINES: Status: ACTIVE | Noted: 2023-09-15

## 2023-09-15 LAB
ALBUMIN SERPL-MCNC: 3.7 G/DL (ref 3.4–5)
ALBUMIN/GLOB SERPL: 1.6 {RATIO} (ref 1.1–2.2)
ALP SERPL-CCNC: 61 U/L (ref 40–129)
ALT SERPL-CCNC: 10 U/L (ref 10–40)
ANION GAP SERPL CALCULATED.3IONS-SCNC: 7 MMOL/L (ref 3–16)
AST SERPL-CCNC: 8 U/L (ref 15–37)
BILIRUB SERPL-MCNC: <0.2 MG/DL (ref 0–1)
BUN SERPL-MCNC: 10 MG/DL (ref 7–20)
CALCIUM SERPL-MCNC: 9.4 MG/DL (ref 8.3–10.6)
CHLORIDE SERPL-SCNC: 109 MMOL/L (ref 99–110)
CHOLEST SERPL-MCNC: 157 MG/DL (ref 0–199)
CO2 SERPL-SCNC: 26 MMOL/L (ref 21–32)
CREAT SERPL-MCNC: 0.6 MG/DL (ref 0.6–1.1)
GFR SERPLBLD CREATININE-BSD FMLA CKD-EPI: >60 ML/MIN/{1.73_M2}
GLUCOSE SERPL-MCNC: 84 MG/DL (ref 70–99)
HDLC SERPL-MCNC: 32 MG/DL (ref 40–60)
LDLC SERPL CALC-MCNC: 104 MG/DL
POTASSIUM SERPL-SCNC: 4.7 MMOL/L (ref 3.5–5.1)
PROT SERPL-MCNC: 6 G/DL (ref 6.4–8.2)
SODIUM SERPL-SCNC: 142 MMOL/L (ref 136–145)
TRIGL SERPL-MCNC: 106 MG/DL (ref 0–150)
VLDLC SERPL CALC-MCNC: 21 MG/DL

## 2023-09-15 PROCEDURE — 99396 PREV VISIT EST AGE 40-64: CPT | Performed by: PHYSICIAN ASSISTANT

## 2023-09-15 PROCEDURE — 36415 COLL VENOUS BLD VENIPUNCTURE: CPT | Performed by: PHYSICIAN ASSISTANT

## 2023-09-15 RX ORDER — ERENUMAB-AOOE 70 MG/ML
INJECTION SUBCUTANEOUS
Qty: 3 ML | Refills: 0 | Status: SHIPPED | OUTPATIENT
Start: 2023-09-15

## 2023-09-15 RX ORDER — TOPIRAMATE 100 MG/1
100 TABLET, FILM COATED ORAL DAILY
Qty: 90 TABLET | Refills: 1 | Status: SHIPPED | OUTPATIENT
Start: 2023-09-15

## 2023-09-15 RX ORDER — AMITRIPTYLINE HYDROCHLORIDE 50 MG/1
50 TABLET, FILM COATED ORAL NIGHTLY
Qty: 90 TABLET | Refills: 1 | Status: SHIPPED | OUTPATIENT
Start: 2023-09-15

## 2023-09-15 RX ORDER — ARIPIPRAZOLE 15 MG/1
15 TABLET ORAL DAILY
Qty: 90 TABLET | Refills: 1 | Status: SHIPPED | OUTPATIENT
Start: 2023-09-15

## 2023-09-15 RX ORDER — VERAPAMIL HYDROCHLORIDE 100 MG/1
1 CAPSULE, EXTENDED RELEASE ORAL DAILY
Qty: 90 CAPSULE | Refills: 1 | Status: SHIPPED | OUTPATIENT
Start: 2023-09-15

## 2023-09-15 RX ORDER — DULOXETIN HYDROCHLORIDE 60 MG/1
60 CAPSULE, DELAYED RELEASE ORAL DAILY
Qty: 90 CAPSULE | Refills: 1 | Status: SHIPPED | OUTPATIENT
Start: 2023-09-15

## 2023-09-15 RX ORDER — ACETAMINOPHEN 500 MG
500 TABLET ORAL EVERY 6 HOURS PRN
COMMUNITY

## 2023-09-15 RX ORDER — DULOXETIN HYDROCHLORIDE 30 MG/1
30 CAPSULE, DELAYED RELEASE ORAL DAILY
Qty: 90 CAPSULE | Refills: 1 | Status: SHIPPED | OUTPATIENT
Start: 2023-09-15

## 2023-09-15 SDOH — ECONOMIC STABILITY: INCOME INSECURITY: HOW HARD IS IT FOR YOU TO PAY FOR THE VERY BASICS LIKE FOOD, HOUSING, MEDICAL CARE, AND HEATING?: SOMEWHAT HARD

## 2023-09-15 SDOH — ECONOMIC STABILITY: FOOD INSECURITY: WITHIN THE PAST 12 MONTHS, YOU WORRIED THAT YOUR FOOD WOULD RUN OUT BEFORE YOU GOT MONEY TO BUY MORE.: NEVER TRUE

## 2023-09-15 SDOH — ECONOMIC STABILITY: FOOD INSECURITY: WITHIN THE PAST 12 MONTHS, THE FOOD YOU BOUGHT JUST DIDN'T LAST AND YOU DIDN'T HAVE MONEY TO GET MORE.: NEVER TRUE

## 2023-09-15 ASSESSMENT — PATIENT HEALTH QUESTIONNAIRE - PHQ9
SUM OF ALL RESPONSES TO PHQ QUESTIONS 1-9: 3
5. POOR APPETITE OR OVEREATING: 1
SUM OF ALL RESPONSES TO PHQ9 QUESTIONS 1 & 2: 0
7. TROUBLE CONCENTRATING ON THINGS, SUCH AS READING THE NEWSPAPER OR WATCHING TELEVISION: 0
9. THOUGHTS THAT YOU WOULD BE BETTER OFF DEAD, OR OF HURTING YOURSELF: 0
4. FEELING TIRED OR HAVING LITTLE ENERGY: 1
1. LITTLE INTEREST OR PLEASURE IN DOING THINGS: 0
8. MOVING OR SPEAKING SO SLOWLY THAT OTHER PEOPLE COULD HAVE NOTICED. OR THE OPPOSITE, BEING SO FIGETY OR RESTLESS THAT YOU HAVE BEEN MOVING AROUND A LOT MORE THAN USUAL: 0
SUM OF ALL RESPONSES TO PHQ QUESTIONS 1-9: 3
2. FEELING DOWN, DEPRESSED OR HOPELESS: 0
6. FEELING BAD ABOUT YOURSELF - OR THAT YOU ARE A FAILURE OR HAVE LET YOURSELF OR YOUR FAMILY DOWN: 0
3. TROUBLE FALLING OR STAYING ASLEEP: 1
SUM OF ALL RESPONSES TO PHQ QUESTIONS 1-9: 3
10. IF YOU CHECKED OFF ANY PROBLEMS, HOW DIFFICULT HAVE THESE PROBLEMS MADE IT FOR YOU TO DO YOUR WORK, TAKE CARE OF THINGS AT HOME, OR GET ALONG WITH OTHER PEOPLE: 0
SUM OF ALL RESPONSES TO PHQ QUESTIONS 1-9: 3

## 2023-09-15 NOTE — PROGRESS NOTES
round, and reactive to light. Neck: Neck supple. No JVD present. Carotid bruit is not present. No mass and no thyromegaly present. Cardiovascular: Normal rate, regular rhythm, normal heart sounds and intact distal pulses. Exam reveals no gallop and no friction rub. No murmur heard. Pulmonary/Chest: Effort normal and breath sounds normal. No respiratory distress. She has no wheezes, rhonchi or rales. Abdominal: Soft, non-tender. Bowel sounds and aorta are normal. She exhibits no organomegaly, mass or bruit. Musculoskeletal: Normal range of motion, no synovitis. She exhibits no edema. Neurological: She is alert and oriented to person, place, and time. She has normal reflexes. No cranial nerve deficit. Coordination normal.   Skin: Skin is warm and dry. There is no rash or erythema. No suspicious lesions noted. Psychiatric: She has a normal mood and affect. Her speech is normal and behavior is normal. Judgment, cognition and memory are normal.         Assessment/Plan:       Diagnosis Orders   1. Annual physical exam  Lipid Panel    Comprehensive Metabolic Panel      2.  Colon cancer screening  Fecal DNA Colorectal cancer screening (Cologuard)        Smoking cessation discussed

## 2023-09-19 ENCOUNTER — TELEPHONE (OUTPATIENT)
Dept: ADMINISTRATIVE | Age: 48
End: 2023-09-19

## 2023-09-19 NOTE — TELEPHONE ENCOUNTER
Submitted PA for Aimovig 70MG/ML auto-injectors  Via CMM Key: [id number] STATUS: PENDING. Follow up done daily; if no response in three days we will refax for status check. If another three days goes by with no response we will call the insurance for status.

## 2023-10-11 LAB — NONINV COLON CA DNA+OCC BLD SCRN STL QL: NEGATIVE

## 2023-10-22 ENCOUNTER — APPOINTMENT (OUTPATIENT)
Dept: CT IMAGING | Age: 48
End: 2023-10-22
Payer: COMMERCIAL

## 2023-10-22 ENCOUNTER — HOSPITAL ENCOUNTER (EMERGENCY)
Age: 48
Discharge: HOME | End: 2023-10-22
Payer: COMMERCIAL

## 2023-10-22 VITALS
SYSTOLIC BLOOD PRESSURE: 140 MMHG | HEART RATE: 80 BPM | RESPIRATION RATE: 16 BRPM | BODY MASS INDEX: 22.44 KG/M2 | TEMPERATURE: 98.2 F | DIASTOLIC BLOOD PRESSURE: 96 MMHG | OXYGEN SATURATION: 100 % | WEIGHT: 143 LBS | HEIGHT: 67 IN

## 2023-10-22 DIAGNOSIS — R51.9 ACUTE NONINTRACTABLE HEADACHE, UNSPECIFIED HEADACHE TYPE: Primary | ICD-10-CM

## 2023-10-22 PROCEDURE — 96375 TX/PRO/DX INJ NEW DRUG ADDON: CPT

## 2023-10-22 PROCEDURE — 6360000002 HC RX W HCPCS: Performed by: NURSE PRACTITIONER

## 2023-10-22 PROCEDURE — 99284 EMERGENCY DEPT VISIT MOD MDM: CPT

## 2023-10-22 PROCEDURE — 2580000003 HC RX 258: Performed by: NURSE PRACTITIONER

## 2023-10-22 PROCEDURE — 70450 CT HEAD/BRAIN W/O DYE: CPT

## 2023-10-22 PROCEDURE — 96374 THER/PROPH/DIAG INJ IV PUSH: CPT

## 2023-10-22 RX ORDER — KETOROLAC TROMETHAMINE 30 MG/ML
30 INJECTION, SOLUTION INTRAMUSCULAR; INTRAVENOUS ONCE
Status: COMPLETED | OUTPATIENT
Start: 2023-10-22 | End: 2023-10-22

## 2023-10-22 RX ORDER — DIPHENHYDRAMINE HYDROCHLORIDE 50 MG/ML
25 INJECTION INTRAMUSCULAR; INTRAVENOUS ONCE
Status: COMPLETED | OUTPATIENT
Start: 2023-10-22 | End: 2023-10-22

## 2023-10-22 RX ORDER — METOCLOPRAMIDE HYDROCHLORIDE 5 MG/ML
10 INJECTION INTRAMUSCULAR; INTRAVENOUS ONCE
Status: COMPLETED | OUTPATIENT
Start: 2023-10-22 | End: 2023-10-22

## 2023-10-22 RX ORDER — 0.9 % SODIUM CHLORIDE 0.9 %
1000 INTRAVENOUS SOLUTION INTRAVENOUS ONCE
Status: COMPLETED | OUTPATIENT
Start: 2023-10-22 | End: 2023-10-22

## 2023-10-22 RX ADMIN — SODIUM CHLORIDE 1000 ML: 9 INJECTION, SOLUTION INTRAVENOUS at 17:03

## 2023-10-22 RX ADMIN — METOCLOPRAMIDE HYDROCHLORIDE 10 MG: 5 INJECTION INTRAMUSCULAR; INTRAVENOUS at 17:07

## 2023-10-22 RX ADMIN — KETOROLAC TROMETHAMINE 30 MG: 30 INJECTION, SOLUTION INTRAMUSCULAR; INTRAVENOUS at 17:05

## 2023-10-22 RX ADMIN — DIPHENHYDRAMINE HYDROCHLORIDE 25 MG: 50 INJECTION INTRAMUSCULAR; INTRAVENOUS at 17:06

## 2023-10-22 ASSESSMENT — PAIN SCALES - GENERAL
PAINLEVEL_OUTOF10: 8
PAINLEVEL_OUTOF10: 8

## 2023-10-22 ASSESSMENT — PAIN DESCRIPTION - LOCATION: LOCATION: HEAD

## 2023-10-22 ASSESSMENT — LIFESTYLE VARIABLES
HOW MANY STANDARD DRINKS CONTAINING ALCOHOL DO YOU HAVE ON A TYPICAL DAY: 1 OR 2
HOW OFTEN DO YOU HAVE A DRINK CONTAINING ALCOHOL: MONTHLY OR LESS

## 2023-10-22 ASSESSMENT — PAIN - FUNCTIONAL ASSESSMENT: PAIN_FUNCTIONAL_ASSESSMENT: 0-10

## 2023-10-22 NOTE — ED NOTES
Writer reviewed discharge instructions,  and follow-up with neurology. patient verbalized understanding. Patient's IV removed with no complications with dressing in place. Patient stable, ambulatory with steady gait, GCS 15, no signs/ symptoms of acute distress, respirations unlabored, and by self. Patient discharged with documented belongings.       Timur Biggs RN  10/22/23 1238 Cellulitis, abdominal wall

## 2023-10-23 ENCOUNTER — TELEPHONE (OUTPATIENT)
Dept: FAMILY MEDICINE CLINIC | Age: 48
End: 2023-10-23

## 2023-10-23 DIAGNOSIS — G43.719 INTRACTABLE CHRONIC MIGRAINE WITHOUT AURA AND WITHOUT STATUS MIGRAINOSUS: Primary | ICD-10-CM

## 2023-10-23 NOTE — TELEPHONE ENCOUNTER
Pt wanted referral to Neurology for Maite Edmondson. Pt went to Emergency Department for migraines and was referred to neurology. However,  won't accept referral from ER and needs it from primary care provider. Referral is pended to be signed.

## 2023-10-23 NOTE — TELEPHONE ENCOUNTER
----- Message from April Ghislaine sent at 10/23/2023 10:10 AM EDT -----  Subject: Referral Request    Reason for referral request? Patient was seen yesterday in the emergency   room for her chronic migraines and was referred to neurology. this   neurologist needs a referral before they will see her. she was referred to   dr. Kristyn Moran. please call patient back to advise, thank you   Provider patient wants to be referred to(if known):     Provider Phone Number(if known):     Additional Information for Provider?   ---------------------------------------------------------------------------  --------------  [address] [place] [address] Malina    [phone]; OK to leave message on voicemail  ---------------------------------------------------------------------------  --------------

## 2023-10-26 NOTE — ED PROVIDER NOTES
[address] [place] [address]  ED  EMERGENCY DEPARTMENT ENCOUNTER        Pt Name: Moira Cruz  MRN: [id number]  [facility] 1975  Date of evaluation: 10/22/2023  Provider: JANUARY Hernandez - JOSE  PCP: EASTON Leal  Note Started: 2:02 PM EDT 10/26/23      DEBRA. I have evaluated this patient. CHIEF COMPLAINT       Chief Complaint   Patient presents with    Migraine     Pt reports headache that started around 1030 today. Pt has hx of migraines and feels like this is 8/10. Pt denies trauma. Pt reports nausea. HISTORY OF PRESENT ILLNESS: 1 or more Elements     History From: the patient  Limitations to history : None    Moira Cruz is a 46 y.o. female who presents to the emergency room today with complaints of a migraine, patient states that pain started about 1030 today, states that she has a history of migraines but states that this is the worst that she is ever had, denies any trauma states it just feels different. She is here for further evaluation. Nursing Notes were all reviewed and agreed with or any disagreements were addressed in the HPI. REVIEW OF SYSTEMS :      Review of Systems    Positives and Pertinent negatives as per HPI. SURGICAL HISTORY     Past Surgical History:   Procedure Laterality Date    APPENDECTOMY  08/2012    BREAST BIOPSY Left     BREAST LUMPECTOMY      CYST REMOVAL N/A 1994    l index    HERNIA REPAIR  10/2012    KNEE ARTHROPLASTY  05/1991    KNEE ARTHROSCOPY Bilateral 91,92    LEEP      SMALL INTESTINE SURGERY  2015    partial       CURRENTMEDICATIONS       Discharge Medication List as of 10/22/2023  5:37 PM        CONTINUE these medications which have NOT CHANGED    Details   acetaminophen (TYLENOL) 500 MG tablet Take 1 tablet by mouth every 6 hours as needed for PainHistorical Med      !! DULoxetine (CYMBALTA) 30 MG extended release capsule Take 1 capsule by mouth daily, Disp-90 capsule, R-1Normal      !!  DULoxetine (CYMBALTA) 60 MG extended

## 2023-11-06 ENCOUNTER — TELEPHONE (OUTPATIENT)
Dept: FAMILY MEDICINE CLINIC | Age: 48
End: 2023-11-06

## 2023-11-06 RX ORDER — UBROGEPANT 50 MG/1
TABLET ORAL
Qty: 30 TABLET | Refills: 0 | Status: SHIPPED | OUTPATIENT
Start: 2023-11-06

## 2023-11-06 NOTE — TELEPHONE ENCOUNTER
Pt called the office and stated that she could not get in with a neurologist until 1/15/24. She states that she has migraines almost daily and cannot got hat long without any help for her migraines. She wants to know what she should do.

## 2023-11-06 NOTE — PROGRESS NOTES
Spoke with patient, the last few months migraines flaring. Has been to ER twice. They are lasting up to a week at a time. Taking her normal topamax, verapamil and elavil. Has been on triptans in the past which she did not tolerate and were ineffective. Seeing neurology 1/24. Trial of ubrelvy sent to pharmacy until seen.

## 2023-11-07 ENCOUNTER — TELEPHONE (OUTPATIENT)
Dept: ADMINISTRATIVE | Age: 48
End: 2023-11-07

## 2023-11-07 NOTE — TELEPHONE ENCOUNTER
Submitted PA for UBRELVY 50MG  TABLETS Via [facility]  (Key: [id number]) STATUS: PENDING. Follow up done daily; if no response in three days we will refax for status check. If another three days goes by with no response we will call the insurance for status.

## 2023-11-13 RX ORDER — UBROGEPANT 50 MG/1
TABLET ORAL
Qty: 16 TABLET | Refills: 0 | Status: SHIPPED | OUTPATIENT
Start: 2023-11-13

## 2023-11-13 NOTE — TELEPHONE ENCOUNTER
Additional questions received from [facility] Navid Callahan. Please advise. 1) The quantity requested exceeds the qty limit of the pharmacy benefit, which is sixteen (16) Ubrelvy tablets per 30 days. Could a trial of sixteen (16) Ubrelvy 50 mg tablets per 30 days be considered? 2) If no to question 1, please provide additional information regarding why the suggested dosing as described above would be inappropriate or ineffective. 3) The quantity requested exceeds the quantity limit of the pharmacy benefit, which is sixteen (16) Ubrelvy 50mg tablets per 30 day- supply. Could a trial of Ubrelvy 100mg tablets at a qty of sixteen (16) be considered? 4) If not to question 3, please provide additional information re:why the suggested dosing as described above would be inappropriate or ineffective? If this requires a response please respond to the pool ( P [facility] [phone]). Thank you please advise patient.

## 2023-11-14 NOTE — TELEPHONE ENCOUNTER
The medication is APPROVED 11/13/23 thru 05/011/24 for 16 tablets per 30 days. If this requires a response please respond to the pool ( P [facility] [phone]). Thank you please advise patient.

## 2023-11-14 NOTE — TELEPHONE ENCOUNTER
Pt was informed that insurance will only cover 16 tablets per 30 days. Pt was wondering if she was supposed to take it every other day and take it as onset of migraine as she was told in office or use it only at onset of migraine? Pt does not think 16 tablets will be enough to take it every other day and use it at onset of migraine.

## 2024-01-03 ENCOUNTER — OFFICE VISIT (OUTPATIENT)
Dept: NEUROLOGY | Age: 49
End: 2024-01-03
Payer: COMMERCIAL

## 2024-01-03 VITALS — BODY MASS INDEX: 21.97 KG/M2 | WEIGHT: 140 LBS | HEIGHT: 67 IN | HEART RATE: 79 BPM

## 2024-01-03 DIAGNOSIS — G44.221 CHRONIC TENSION-TYPE HEADACHE, INTRACTABLE: ICD-10-CM

## 2024-01-03 DIAGNOSIS — F51.01 PRIMARY INSOMNIA: ICD-10-CM

## 2024-01-03 DIAGNOSIS — F34.1 DYSTHYMIA: ICD-10-CM

## 2024-01-03 DIAGNOSIS — G43.111 MIGRAINE WITH AURA, WITH INTRACTABLE MIGRAINE, SO STATED, WITH STATUS MIGRAINOSUS: Primary | ICD-10-CM

## 2024-01-03 PROCEDURE — 99204 OFFICE O/P NEW MOD 45 MIN: CPT | Performed by: PSYCHIATRY & NEUROLOGY

## 2024-01-03 RX ORDER — UBROGEPANT 100 MG/1
100 TABLET ORAL PRN
Qty: 16 TABLET | Refills: 0 | Status: SHIPPED | OUTPATIENT
Start: 2024-01-03

## 2024-01-03 RX ORDER — ERENUMAB-AOOE 140 MG/ML
140 INJECTION, SOLUTION SUBCUTANEOUS
Qty: 1 ML | Refills: 5 | Status: SHIPPED | OUTPATIENT
Start: 2024-01-03 | End: 2024-04-02

## 2024-01-03 NOTE — PROGRESS NOTES
Bilateral 91,92    LEEP      SMALL INTESTINE SURGERY  2015    partial       ROS : A 10-14 system review of constitutional, cardiovascular, respiratory, GI, Eyes, ENT, musculoskeletal, endocrine, skin, genitourinary, psychiatric and neurologic systems was obtained and updated today and is unremarkable except as mentioned in my HPI      Exam:   Constitutional:   Vitals:    01/03/24 1434   Pulse: 79   Weight: 63.5 kg (140 lb)   Height: 1.702 m (5' 7.01\")       General appearance:  Normal development and appear in no acute distress.   Mental Status:   Oriented to person, place, problem, and time.    Memory: Aware of recent and remote event. Good immediate recall.  Intact remote memory  Normal attention span and concentration.  Language: intact naming, repeating and fluency   Good fund of Knowledge. Aware of current events and vocabulary   Cranial Nerves:   II: Visual fields: Full to confrontation.  Pupils: equal, round, reactive to light  III,IV,VI: Extra Ocular Movements are intact. No nystagmus  V: Facial sensation is intact  VII: Facial strength and movements: intact and symmetric  VIII: Hearing: Intact  IX: Palate elevation is symmetric  XI: Shoulder shrug is intact  XII: Tongue movements are normal  Musculoskeletal: 5/5 in all 4 extremities.   Tone: Normal tone.   Reflexes: 2+ in both arms and legs  Planters: flexor bilaterally.  Coordination: no pronator drift, no dysmetria with FNF. Normal REM.   Sensation: normal to all modalities in both arms and legs.  Gait/Posture: steady gait and normal posturing and station.       Medical decision making:  I personally reviewed and updated social history, past medical history, medications, allergy, surgical history, and family history as documented in the patient's electronic health records.         A. Problems (any 1)    High:    [] Acute/Chronic Illness/injury posing threat to life or bodily function:    [] Severe exacerbation of chronic illness:      Moderate:    [x]

## 2024-01-05 ENCOUNTER — HOSPITAL ENCOUNTER (OUTPATIENT)
Dept: WOMENS IMAGING | Age: 49
Discharge: HOME | End: 2024-01-05
Payer: COMMERCIAL

## 2024-01-05 ENCOUNTER — HOSPITAL ENCOUNTER (OUTPATIENT)
Dept: WOMENS IMAGING | Age: 49
End: 2024-01-05
Payer: COMMERCIAL

## 2024-01-05 DIAGNOSIS — R92.8 ABNORMAL MAMMOGRAM: ICD-10-CM

## 2024-01-05 LAB — MAMMOGRAPHY, EXTERNAL: NORMAL

## 2024-01-05 PROCEDURE — G0279 TOMOSYNTHESIS, MAMMO: HCPCS

## 2024-01-08 ENCOUNTER — TELEPHONE (OUTPATIENT)
Dept: WOMENS IMAGING | Age: 49
End: 2024-01-08

## 2024-01-08 NOTE — TELEPHONE ENCOUNTER
[facility]  [address], [address]  [place], [place] [address]   Phone: [phone]          NAME:  Jesus Olivas  YOB: 1975  MEDICAL RECORD NUMBER:  <[id number]>  TODAY'S DATE:  1/8/2024      Referring Physician: Dr. Jenny Castro    Procedure: Stereotactic Bx    Right breast    Date of biopsy:     Patient taking blood thinners: no    Medicine allergies: yes - codeine    Special Instructions: n/a    Reviewed pre and post biopsy instructions/information with patient.  Pt verbalized understanding.     Biopsy order form faxed to referring MD.      Stereotactic Biopsy Education     What is it?  Stereotactic breast biopsy is a test that uses imaging, such as  X-ray, to find an area of your breast where a tissue sample will be taken. The sample is viewed  under a microscope to check for signs of breast cancer.     Why is this test done?  This type of breast biopsy is usually done to check for cancer in a lump or microcalcifications found during a mammogram.     How can you prepare for the test?    You may take your regular medications as prescribed.   You may eat and drink before the test.  Take a shower the evening or morning before the biopsy.    What happens before the test?   Mammogram images are taken to find the exact site for the biopsy.  Your skin is washed with an alcohol prep.    You will be given an injection of medication to numb your breast.     What happens during the test?  When your breast is numb, a small cut is made in the skin.   Using the imaging, the doctor will guide the needle into the biopsy area.   A sample of breast tissue is taken through the needle.   A small clip is inserted into your breast to yolande the biopsy site.   The needle is removed and pressure put on the needle site to stop any bleeding.   Steri Strips and a bandage will be placed over the site.      How long does the test take? About 60 minutes. Most of the time is

## 2024-01-16 DIAGNOSIS — G43.111 MIGRAINE WITH AURA, WITH INTRACTABLE MIGRAINE, SO STATED, WITH STATUS MIGRAINOSUS: ICD-10-CM

## 2024-01-17 RX ORDER — UBROGEPANT 100 MG/1
TABLET ORAL
Qty: 16 TABLET | Refills: 0 | Status: SHIPPED | OUTPATIENT
Start: 2024-01-17

## 2024-01-22 ENCOUNTER — HOSPITAL ENCOUNTER (OUTPATIENT)
Dept: WOMENS IMAGING | Age: 49
Discharge: HOME | End: 2024-01-22
Payer: COMMERCIAL

## 2024-01-22 DIAGNOSIS — R92.8 ABNORMAL MAMMOGRAM: ICD-10-CM

## 2024-01-22 PROCEDURE — 88342 IMHCHEM/IMCYTCHM 1ST ANTB: CPT

## 2024-01-22 PROCEDURE — 2720000010 MAM STEREO BREAST BX W LOC DEVICE 1ST LESION RIGHT

## 2024-01-22 PROCEDURE — 88305 TISSUE EXAM BY PATHOLOGIST: CPT

## 2024-01-22 PROCEDURE — 77065 DX MAMMO INCL CAD UNI: CPT

## 2024-01-22 PROCEDURE — 76098 X-RAY EXAM SURGICAL SPECIMEN: CPT

## 2024-01-22 NOTE — PROGRESS NOTES
Results will be available in 2-3 working days.  Your referring physician or the Nurse Navigator will call you with results.       [facility] Information:   Should you experience any significant changes in your health or have questions about your care, please contact:  Shannan Leyva, Nurse Breast Navigator with [facility] [place], [place]  [phone]  Monday-Friday.  If you need help with your care outside these hours and cannot wait until we are again available, contact your Physician or go to the hospital emergency room.        Electronically signed by Shannan Leyva RN on 1/22/2024 at 11:22 AM

## 2024-01-24 ENCOUNTER — TELEPHONE (OUTPATIENT)
Dept: WOMENS IMAGING | Age: 49
End: 2024-01-24

## 2024-01-24 NOTE — TELEPHONE ENCOUNTER
Pathology results complete from breast biopsy. Radiologist confirms concordance.     Breast Navigator reviewed results of breast biopsy with patient. Results are negative for any malignancy on the pathology report.  The radiologist is recommending a RIGHT breast mammogram in 6 months. Patient verbalized understanding.  Path report faxed to referring physician.     Shannan Leyva RN

## 2024-01-25 ENCOUNTER — OFFICE VISIT (OUTPATIENT)
Dept: OBGYN CLINIC | Age: 49
End: 2024-01-25
Payer: COMMERCIAL

## 2024-01-25 VITALS
WEIGHT: 142.6 LBS | OXYGEN SATURATION: 97 % | HEIGHT: 67 IN | TEMPERATURE: 97.9 F | DIASTOLIC BLOOD PRESSURE: 75 MMHG | SYSTOLIC BLOOD PRESSURE: 112 MMHG | HEART RATE: 68 BPM | BODY MASS INDEX: 22.38 KG/M2

## 2024-01-25 DIAGNOSIS — Z97.5 IUD (INTRAUTERINE DEVICE) IN PLACE: ICD-10-CM

## 2024-01-25 DIAGNOSIS — N64.89 PSEUDOANGIOMATOUS STROMAL HYPERPLASIA OF BREAST: ICD-10-CM

## 2024-01-25 DIAGNOSIS — Z12.4 SCREENING FOR CERVICAL CANCER: ICD-10-CM

## 2024-01-25 DIAGNOSIS — Z01.419 WOMEN'S ANNUAL ROUTINE GYNECOLOGICAL EXAMINATION: Primary | ICD-10-CM

## 2024-01-25 DIAGNOSIS — Z98.890 HISTORY OF LOOP ELECTRICAL EXCISION PROCEDURE (LEEP): ICD-10-CM

## 2024-01-25 PROCEDURE — 99396 PREV VISIT EST AGE 40-64: CPT | Performed by: OBSTETRICS & GYNECOLOGY

## 2024-01-25 ASSESSMENT — ENCOUNTER SYMPTOMS
BACK PAIN: 1
CONSTIPATION: 0
BLOOD IN STOOL: 0

## 2024-01-25 NOTE — PROGRESS NOTES
Referral to breast cancer surgeon due to PASH  Pelvic exam was done and ASCCP guidelines reviewed   PAP smear collected today with results to follow, history of LEEP  RTC in 1 year or sooner as clinically indicated.     Follow Up  - Will call patient with results   -No follow-ups on file.     Alka Smith, DO

## 2024-01-26 LAB — HPV16+18+H RISK 12 DNA SPEC-IMP: NORMAL

## 2024-02-02 LAB
HPV HR 12 DNA SPEC QL NAA+PROBE: DETECTED
HPV16 DNA SPEC QL NAA+PROBE: NOT DETECTED
HPV16+18+H RISK 12 DNA SPEC-IMP: ABNORMAL
HPV18 DNA SPEC QL NAA+PROBE: NOT DETECTED

## 2024-02-09 DIAGNOSIS — G43.111 MIGRAINE WITH AURA, WITH INTRACTABLE MIGRAINE, SO STATED, WITH STATUS MIGRAINOSUS: ICD-10-CM

## 2024-02-09 RX ORDER — UBROGEPANT 100 MG/1
TABLET ORAL
Qty: 10 TABLET | OUTPATIENT
Start: 2024-02-09

## 2024-02-12 ENCOUNTER — TRANSCRIBE ORDERS (OUTPATIENT)
Dept: ADMINISTRATIVE | Age: 49
End: 2024-02-12

## 2024-02-12 DIAGNOSIS — R92.2 INCONCLUSIVE MAMMOGRAPHY DUE TO DENSE BREASTS: Primary | ICD-10-CM

## 2024-02-12 DIAGNOSIS — R92.30 INCONCLUSIVE MAMMOGRAPHY DUE TO DENSE BREASTS: Primary | ICD-10-CM

## 2024-02-13 ENCOUNTER — OFFICE VISIT (OUTPATIENT)
Dept: OBGYN CLINIC | Age: 49
End: 2024-02-13

## 2024-02-13 VITALS
HEART RATE: 68 BPM | SYSTOLIC BLOOD PRESSURE: 115 MMHG | TEMPERATURE: 97.8 F | DIASTOLIC BLOOD PRESSURE: 65 MMHG | BODY MASS INDEX: 22.4 KG/M2 | OXYGEN SATURATION: 98 % | WEIGHT: 143 LBS

## 2024-02-13 DIAGNOSIS — B97.7 HPV IN FEMALE: Primary | ICD-10-CM

## 2024-02-13 LAB
CONTROL: NORMAL
PREGNANCY TEST URINE, POC: NEGATIVE

## 2024-02-13 NOTE — PROGRESS NOTES
Colposcopy Procedure Note    Indications: Pt presents for colposcopy secondary to  ASCUS + HR HPV, She has a history of three or four LEEPs previously       Procedure Details   The risks and benefits of the procedure were discussed in detail with the patient. She was aware the risks may include, but are not limited to bleeding, infection and damage to surround structures. She acknowledged these risks and elected to proceed. Written consent was obtained.    Urine HCG testing: negative     A speculum was placed in vagina and excellent visualization of cervix was achieved, the cervix was swabbed x3 with acetic acid solution. Acetowhite lesions noted at 6:00 o'clock.    Biopsies were obtained at 6:00 o'clock. ECC was performed.     Colposcopy was satisfactory       Findings:  Cervix: see above   Vaginal inspection: Normal without gross visible lesions  Vulvar inspection: Normal without gross visible lesions    Specimens: cervical biopsy at 6:00 o'clock, endocervical curettage     Complications: none    Plan:  Specimens labeled and sent to Pathology  Aftercare instructions were discussed  Will follow up with results and treatment plan  Patient to call with any further questions or concerns    Alka Smith, DO

## 2024-03-01 ENCOUNTER — HOSPITAL ENCOUNTER (OUTPATIENT)
Dept: MRI IMAGING | Age: 49
Discharge: HOME | End: 2024-03-01
Attending: SURGERY
Payer: COMMERCIAL

## 2024-03-01 DIAGNOSIS — R92.30 INCONCLUSIVE MAMMOGRAPHY DUE TO DENSE BREASTS: ICD-10-CM

## 2024-03-01 DIAGNOSIS — R92.2 INCONCLUSIVE MAMMOGRAPHY DUE TO DENSE BREASTS: ICD-10-CM

## 2024-03-01 PROCEDURE — 6360000004 HC RX CONTRAST MEDICATION: Performed by: SURGERY

## 2024-03-01 PROCEDURE — A9579 GAD-BASE MR CONTRAST NOS,1ML: HCPCS | Performed by: SURGERY

## 2024-03-01 PROCEDURE — C8908 MRI W/O FOL W/CONT, BREAST,: HCPCS

## 2024-03-01 RX ADMIN — GADOTERIDOL 13 ML: 279.3 INJECTION, SOLUTION INTRAVENOUS at 15:10

## 2024-03-05 ENCOUNTER — TELEPHONE (OUTPATIENT)
Dept: OBGYN CLINIC | Age: 49
End: 2024-03-05

## 2024-03-06 ENCOUNTER — PREP FOR PROCEDURE (OUTPATIENT)
Dept: OBGYN CLINIC | Age: 49
End: 2024-03-06

## 2024-03-06 ENCOUNTER — OFFICE VISIT (OUTPATIENT)
Dept: OBGYN CLINIC | Age: 49
End: 2024-03-06
Payer: COMMERCIAL

## 2024-03-06 VITALS
DIASTOLIC BLOOD PRESSURE: 87 MMHG | HEART RATE: 67 BPM | BODY MASS INDEX: 22.47 KG/M2 | WEIGHT: 143.2 LBS | OXYGEN SATURATION: 98 % | SYSTOLIC BLOOD PRESSURE: 121 MMHG | TEMPERATURE: 97 F | HEIGHT: 67 IN

## 2024-03-06 DIAGNOSIS — R87.613 HIGH GRADE SQUAMOUS INTRAEPITHELIAL CERVICAL DYSPLASIA: ICD-10-CM

## 2024-03-06 DIAGNOSIS — R87.613 HIGH GRADE SQUAMOUS INTRAEPITHELIAL CERVICAL DYSPLASIA: Primary | ICD-10-CM

## 2024-03-06 PROCEDURE — 99213 OFFICE O/P EST LOW 20 MIN: CPT | Performed by: OBSTETRICS & GYNECOLOGY

## 2024-03-06 NOTE — PROGRESS NOTES
Mood and Affect: Mood normal.         Behavior: Behavior normal.        Images:  No images are attached to the encounter    Assessment/Plan   Diagnosis Orders   1. High grade squamous intraepithelial cervical dysplasia           - Discussed with patient different management options at this time including repeat pap smear vs LEEP. She is interested in a LEEP.   - after review of RBA and surgical consent, pt would like to proceed with LEEP.    - pre / post op instructions reviewed.   - all questions / concerns addressed.     Follow Up   Post Op apt in 2 weeks     Alka Smith DO

## 2024-03-15 RX ORDER — UBROGEPANT 50 MG/1
TABLET ORAL
Qty: 16 TABLET | OUTPATIENT
Start: 2024-03-15

## 2024-03-19 RX ORDER — SODIUM CHLORIDE 0.9 % (FLUSH) 0.9 %
5-40 SYRINGE (ML) INJECTION PRN
Status: CANCELLED | OUTPATIENT
Start: 2024-03-19

## 2024-03-19 RX ORDER — SODIUM CHLORIDE, SODIUM LACTATE, POTASSIUM CHLORIDE, CALCIUM CHLORIDE 600; 310; 30; 20 MG/100ML; MG/100ML; MG/100ML; MG/100ML
INJECTION, SOLUTION INTRAVENOUS CONTINUOUS
Status: CANCELLED | OUTPATIENT
Start: 2024-03-19

## 2024-03-19 RX ORDER — SODIUM CHLORIDE 9 MG/ML
INJECTION, SOLUTION INTRAVENOUS PRN
Status: CANCELLED | OUTPATIENT
Start: 2024-03-19

## 2024-03-19 RX ORDER — SODIUM CHLORIDE 0.9 % (FLUSH) 0.9 %
5-40 SYRINGE (ML) INJECTION EVERY 12 HOURS SCHEDULED
Status: CANCELLED | OUTPATIENT
Start: 2024-03-19

## 2024-03-26 ENCOUNTER — OFFICE VISIT (OUTPATIENT)
Dept: FAMILY MEDICINE CLINIC | Age: 49
End: 2024-03-26
Payer: COMMERCIAL

## 2024-03-26 VITALS
OXYGEN SATURATION: 97 % | SYSTOLIC BLOOD PRESSURE: 122 MMHG | WEIGHT: 143.8 LBS | RESPIRATION RATE: 16 BRPM | HEIGHT: 67 IN | DIASTOLIC BLOOD PRESSURE: 68 MMHG | BODY MASS INDEX: 22.57 KG/M2 | HEART RATE: 82 BPM

## 2024-03-26 DIAGNOSIS — Z01.818 PREOP EXAMINATION: ICD-10-CM

## 2024-03-26 DIAGNOSIS — F51.01 PRIMARY INSOMNIA: ICD-10-CM

## 2024-03-26 DIAGNOSIS — R87.613 HIGH GRADE SQUAMOUS INTRAEPITHELIAL CERVICAL DYSPLASIA: Primary | ICD-10-CM

## 2024-03-26 PROCEDURE — 99214 OFFICE O/P EST MOD 30 MIN: CPT | Performed by: PHYSICIAN ASSISTANT

## 2024-03-26 RX ORDER — AMITRIPTYLINE HYDROCHLORIDE 10 MG/1
5-10 TABLET, FILM COATED ORAL NIGHTLY
Qty: 30 TABLET | Refills: 0 | Status: SHIPPED | OUTPATIENT
Start: 2024-03-26

## 2024-03-26 ASSESSMENT — PATIENT HEALTH QUESTIONNAIRE - PHQ9
SUM OF ALL RESPONSES TO PHQ QUESTIONS 1-9: 6
6. FEELING BAD ABOUT YOURSELF - OR THAT YOU ARE A FAILURE OR HAVE LET YOURSELF OR YOUR FAMILY DOWN: NOT AT ALL
7. TROUBLE CONCENTRATING ON THINGS, SUCH AS READING THE NEWSPAPER OR WATCHING TELEVISION: NOT AT ALL
10. IF YOU CHECKED OFF ANY PROBLEMS, HOW DIFFICULT HAVE THESE PROBLEMS MADE IT FOR YOU TO DO YOUR WORK, TAKE CARE OF THINGS AT HOME, OR GET ALONG WITH OTHER PEOPLE: NOT DIFFICULT AT ALL
4. FEELING TIRED OR HAVING LITTLE ENERGY: NEARLY EVERY DAY
SUM OF ALL RESPONSES TO PHQ QUESTIONS 1-9: 6
9. THOUGHTS THAT YOU WOULD BE BETTER OFF DEAD, OR OF HURTING YOURSELF: NOT AT ALL
3. TROUBLE FALLING OR STAYING ASLEEP: NEARLY EVERY DAY
2. FEELING DOWN, DEPRESSED OR HOPELESS: NOT AT ALL
SUM OF ALL RESPONSES TO PHQ QUESTIONS 1-9: 6
SUM OF ALL RESPONSES TO PHQ QUESTIONS 1-9: 6
1. LITTLE INTEREST OR PLEASURE IN DOING THINGS: NOT AT ALL
8. MOVING OR SPEAKING SO SLOWLY THAT OTHER PEOPLE COULD HAVE NOTICED. OR THE OPPOSITE, BEING SO FIGETY OR RESTLESS THAT YOU HAVE BEEN MOVING AROUND A LOT MORE THAN USUAL: NOT AT ALL
SUM OF ALL RESPONSES TO PHQ9 QUESTIONS 1 & 2: 0
5. POOR APPETITE OR OVEREATING: NOT AT ALL

## 2024-03-26 NOTE — PROGRESS NOTES
Patient has additional concern of insomnia. She has been on melatonin per neurology. It will help some with falling asleep but wakes up after 1-2 hours most nights and has difficulty falling back to sleep. Would like to discuss other options.      Diagnosis Orders   1. High grade squamous intraepithelial cervical dysplasia        2. Preop examination        3. Primary insomnia  Discussed trial of elavil 10 mg at night. Monitor dose as she is on cymbalta.           
midline, no adenopathy, thyroid symmetric, not enlarged and no tenderness, skin normal  Heart:  Normal apical impulse, regular rate and rhythm, normal S1 and S2, no S3 or S4, and no murmur noted  Lungs:  No increased work of breathing, good air exchange, clear to auscultation bilaterally, no crackles or wheezing  Abdomen:  No scars, normal bowel sounds, soft, non-distended, non-tender, no masses palpated, no hepatosplenomegally  Extremities:  No clubbing, cyanosis, or edema  .    DATA:    No PAT required      ASSESSMENT AND PLAN:    1.  Patient is a 48 y.o. female with above specified procedure planned on 4/25/23 with Dr. Alka Smith at [facility].    2. Stop asa and nsaid medications 7-10 days prior to procedure.    Jesus Olivas is Medically stable for surgery. Report will be faxed.

## 2024-03-27 ENCOUNTER — OFFICE VISIT (OUTPATIENT)
Dept: OBGYN CLINIC | Age: 49
End: 2024-03-27
Payer: COMMERCIAL

## 2024-03-27 VITALS
DIASTOLIC BLOOD PRESSURE: 70 MMHG | TEMPERATURE: 97.1 F | HEART RATE: 68 BPM | OXYGEN SATURATION: 98 % | BODY MASS INDEX: 22.57 KG/M2 | WEIGHT: 143.8 LBS | SYSTOLIC BLOOD PRESSURE: 121 MMHG | HEIGHT: 67 IN

## 2024-03-27 DIAGNOSIS — R87.613 HIGH GRADE SQUAMOUS INTRAEPITHELIAL CERVICAL DYSPLASIA: Primary | ICD-10-CM

## 2024-03-27 DIAGNOSIS — B97.7 HPV IN FEMALE: ICD-10-CM

## 2024-03-27 PROCEDURE — 99213 OFFICE O/P EST LOW 20 MIN: CPT | Performed by: OBSTETRICS & GYNECOLOGY

## 2024-03-27 NOTE — PROGRESS NOTES
SOAJ, Inject 140 mg into the skin every 30 days, Disp: 1 mL, Rfl: 5    acetaminophen (TYLENOL) 500 MG tablet, Take 1 tablet by mouth every 6 hours as needed for Pain, Disp: , Rfl:     DULoxetine (CYMBALTA) 30 MG extended release capsule, Take 1 capsule by mouth daily, Disp: 90 capsule, Rfl: 1    DULoxetine (CYMBALTA) 60 MG extended release capsule, Take 1 capsule by mouth daily, Disp: 90 capsule, Rfl: 1    amitriptyline (ELAVIL) 50 MG tablet, Take 1 tablet by mouth nightly, Disp: 90 tablet, Rfl: 1    verapamil (VERELAN PM) 100 MG CP24 extended release capsule, Take 1 capsule by mouth daily, Disp: 90 capsule, Rfl: 1    topiramate (TOPAMAX) 100 MG tablet, Take 1 tablet by mouth daily, Disp: 90 tablet, Rfl: 1    ARIPiprazole (ABILIFY) 15 MG tablet, Take 1 tablet by mouth daily, Disp: 90 tablet, Rfl: 1    calcium carbonate-vitamin D 600-200 MG-UNIT TABS, Take by mouth 2 times daily, Disp: , Rfl:     EPINEPHrine 1 MG/10ML SOSY injection, as needed, Disp: , Rfl:      Social History       Tobacco History       Smoking Status  Every Day Current Packs/Day  0.5 packs/day Average Packs/Day  0.5 packs/day for 20.0 years (10.0 ttl pk-yrs) Smoking Tobacco Type  Cigarettes   Pack Year History     Packs/Day From To Years    0.5   20.0      Smokeless Tobacco Use  Never              Alcohol History       Alcohol Use Status  Yes Drinks/Week  3 Glasses of wine, 0 Cans of beer, 0 Shots of liquor, 0 Drinks containing 0.5 oz of alcohol per week Amount  3.0 standard drinks of alcohol/wk              Drug Use       Drug Use Status  Not Currently Types  Marijuana (Weed)              Sexual Activity       Sexually Active  Yes Partners  Male                   ROS   denies    Objective:  Physical Exam  Vitals and nursing note reviewed.   Constitutional:       Appearance: Normal appearance.   HENT:      Head: Normocephalic and atraumatic.   Eyes:      General:         Right eye: No discharge.         Left eye: No discharge.

## 2024-03-27 NOTE — H&P (VIEW-ONLY)
[facility]   Pre Op Visit     CC:   Chief Complaint   Patient presents with    Pre-op Exam     Pre Op: Sign Consents       HPI:  48 y.o.  who presents to [facility] for Pre-Op visit.   Pt doing well. No changes since last exam . RBA to procedure reviewed / consents signed. All questions answered.     PAP smear ASCUS + HPV     FINAL DIAGNOSIS:        A. Cervix, 6 o' clock, biopsy:        - Dysplastic squamous epithelium, favor high grade - see comment        B. Endocervix, curettage:        - Detached dysplastic squamous epithelium, cannot exclude high grade          - see comment     Pre-op Exam (Pre Op: Sign Consents)      OB History          2    Para   1    Term   1            AB   1    Living   1         SAB        IAB   1    Ectopic        Molar        Multiple        Live Births   1                Past Medical History:   Diagnosis Date    Abnormal Pap smear of cervix     LEEP/ hormome supression/cone biposy    Anxiety     Depression     Headache        Past Surgical History:   Procedure Laterality Date    APPENDECTOMY  2012    BREAST BIOPSY Left     BREAST LUMPECTOMY      CYST REMOVAL N/A     l index    HERNIA REPAIR  10/2012    KNEE ARTHROPLASTY  1991    KNEE ARTHROSCOPY Bilateral 91,92    LEEP      CLAUDIO STEROTACTIC LOC BREAST BIOPSY RIGHT Right 2024    CLAUDIO STEROTACTIC LOC BREAST BIOPSY RIGHT 2024 [facility]    SMALL INTESTINE SURGERY      partial       Allergies   Allergen Reactions    Bee Venom Anaphylaxis    Aloe Other (See Comments)    Glycerin Other (See Comments)    Wheat Germ Oil Hives    Codeine Nausea And Vomiting         Current Outpatient Medications:     amitriptyline (ELAVIL) 10 MG tablet, Take 0.5-1 tablets by mouth nightly, Disp: 30 tablet, Rfl: 0    Ubrogepant (UBRELVY) 100 MG TABS, Take 100mg tablet by mouth q48h as needed for migraine. MAX dosage 2 tablets a week, Disp: 16 tablet, Rfl: 0    Erenumab-aooe (AIMOVIG) 140 MG/ML

## 2024-04-08 DIAGNOSIS — G43.111 MIGRAINE WITH AURA, WITH INTRACTABLE MIGRAINE, SO STATED, WITH STATUS MIGRAINOSUS: ICD-10-CM

## 2024-04-09 RX ORDER — UBROGEPANT 100 MG/1
TABLET ORAL
Qty: 10 TABLET | OUTPATIENT
Start: 2024-04-09

## 2024-04-17 NOTE — PROGRESS NOTES
Jesus Brendon    Age 48 y.o.    female    1975    MRN [id number]    4/25/2024  Arrival Time_____________  OR Time____________66 Min     Procedure(s):  LOOP ELECTROSURGICAL EXCISION PROCEDURE                      General    Surgeon(s):  Alka Smith, DO       Phone [phone] ([place])     [facility]  Date  Info Source  Home  Cell         Work  _____________________________________________________________________  _____________________________________________________________________  _____________________________________________________________________  _____________________________________________________________________  _____________________________________________________________________    PCP _____________________________ Phone_________________     H&P  ________________  Bringing      Chart              Epic      DOS      Called________  EKG ________________   Bringing      Chart              Epic      DOS      Called________  LABS________________   Bringing     Chart              Epic      DOS      Called________  Cardiac Clearance ______ Bringing      Chart              Epic      DOS      Called________  Pulmonary Clearance____ Bringing      Chart              Epic      DOS      Called________    Cardiologist________________________ Phone___________________________  Pulmonologist_______________________Phone___________________________    ? Advance Directives   ? Islam concerns / Waiver on Chart            PAT Communications________________  ? Pre-op Instructions Given /Understood          _________________________________  ? Directions to Surgery Center                          _________________________________  ? Transportation Home_______________      __________________________________  ? Crutches/Walker__________________        __________________________________    Orders: Hard copy/ EPIC                 Transcribed/ EPIC              _______Wt.    ________Pharmacy

## 2024-04-18 NOTE — PROGRESS NOTES
Date and time of surgery :  4/25/24 at 1100            Arrival Time: 0900      Bring Picture ID and insurance card.  Please wear simple, loose fitting clothing to the hospital.   Do not bring valuables (money, credit cards, checkbooks, etc.)   Do not wear any makeup (including  eye makeup) and no nail polish or artificial nails on your fingers or toes.  DO NOT wear any jewelry or piercings on day of surgery.  All body piercing jewelry must be removed.  If you have dentures, they will be removed before going to the OR; we will provide you a container.  If you wear contact lenses or glasses, they will be removed; please bring a case for them.  Shower the evening before or morning of surgery with antibacterial soap.  Nothing to eat or drink after midnight the day before surgery.   You may brush your teeth and gargle the morning of surgery.  DO NOT SWALLOW WATER.   Take only Duloxetine and Verapamil the morning of your surgery with a sip of water.  Aspirin, Ibuprofen, Advil, Naproxen, Vitamin E and other Anti-inflammatory products and supplements should be stopped for 5 -7days before surgery or as directed by your physician.  Do not smoke or drink any alcoholic beverages 24 hours prior to surgery.  This includes NA Beer. Refrain from the usage of any recreational drugs, including non-prescribed prescription drugs.   You MUST plan for a responsible adult to stay on site while you are here and take you home after your surgery. You will not be allowed to leave alone or drive yourself home. It is strongly suggested someone stay with you the first 24 hrs. Your surgery will be cancelled if you do not have a ride home.  To help prevent infection, change your sheets the night before surgery.   If you  have a Living Will and Durable Power of  for Healthcare, please bring in a copy.  Notify your Surgeon if you develop any illness between now and time of surgery. Cough, cold, fever, sore throat, nausea, vomiting, etc.

## 2024-04-25 ENCOUNTER — ANESTHESIA (OUTPATIENT)
Dept: OPERATING ROOM | Age: 49
End: 2024-04-25
Payer: COMMERCIAL

## 2024-04-25 ENCOUNTER — ANESTHESIA EVENT (OUTPATIENT)
Dept: OPERATING ROOM | Age: 49
End: 2024-04-25
Payer: COMMERCIAL

## 2024-04-25 ENCOUNTER — HOSPITAL ENCOUNTER (OUTPATIENT)
Age: 49
Setting detail: OUTPATIENT SURGERY
Discharge: HOME | End: 2024-04-25
Attending: OBSTETRICS & GYNECOLOGY | Admitting: OBSTETRICS & GYNECOLOGY
Payer: COMMERCIAL

## 2024-04-25 VITALS
BODY MASS INDEX: 22.29 KG/M2 | HEART RATE: 64 BPM | OXYGEN SATURATION: 100 % | HEIGHT: 67 IN | WEIGHT: 142 LBS | RESPIRATION RATE: 20 BRPM | DIASTOLIC BLOOD PRESSURE: 88 MMHG | SYSTOLIC BLOOD PRESSURE: 148 MMHG | TEMPERATURE: 98 F

## 2024-04-25 DIAGNOSIS — R87.613 HIGH GRADE SQUAMOUS INTRAEPITHELIAL CERVICAL DYSPLASIA: ICD-10-CM

## 2024-04-25 LAB
ABO + RH BLD: NORMAL
BASOPHILS # BLD: 0 K/UL (ref 0–0.2)
BASOPHILS NFR BLD: 0.5 %
BLD GP AB SCN SERPL QL: NORMAL
DEPRECATED RDW RBC AUTO: 14 % (ref 12.4–15.4)
EOSINOPHIL # BLD: 0.2 K/UL (ref 0–0.6)
EOSINOPHIL NFR BLD: 1.9 %
HCG UR QL: NEGATIVE
HCT VFR BLD AUTO: 45 % (ref 36–48)
HGB BLD-MCNC: 15.2 G/DL (ref 12–16)
LYMPHOCYTES # BLD: 2.3 K/UL (ref 1–5.1)
LYMPHOCYTES NFR BLD: 26.2 %
MCH RBC QN AUTO: 32.2 PG (ref 26–34)
MCHC RBC AUTO-ENTMCNC: 33.8 G/DL (ref 31–36)
MCV RBC AUTO: 95.1 FL (ref 80–100)
MONOCYTES # BLD: 0.6 K/UL (ref 0–1.3)
MONOCYTES NFR BLD: 6.8 %
NEUTROPHILS # BLD: 5.6 K/UL (ref 1.7–7.7)
NEUTROPHILS NFR BLD: 64.6 %
PLATELET # BLD AUTO: 228 K/UL (ref 135–450)
PMV BLD AUTO: 8.8 FL (ref 5–10.5)
RBC # BLD AUTO: 4.73 M/UL (ref 4–5.2)
WBC # BLD AUTO: 8.6 K/UL (ref 4–11)

## 2024-04-25 PROCEDURE — 2500000003 HC RX 250 WO HCPCS: Performed by: NURSE ANESTHETIST, CERTIFIED REGISTERED

## 2024-04-25 PROCEDURE — 3600000013 HC SURGERY LEVEL 3 ADDTL 15MIN: Performed by: OBSTETRICS & GYNECOLOGY

## 2024-04-25 PROCEDURE — 7100000000 HC PACU RECOVERY - FIRST 15 MIN: Performed by: OBSTETRICS & GYNECOLOGY

## 2024-04-25 PROCEDURE — 6370000000 HC RX 637 (ALT 250 FOR IP): Performed by: OBSTETRICS & GYNECOLOGY

## 2024-04-25 PROCEDURE — 2580000003 HC RX 258: Performed by: OBSTETRICS & GYNECOLOGY

## 2024-04-25 PROCEDURE — 3700000000 HC ANESTHESIA ATTENDED CARE: Performed by: OBSTETRICS & GYNECOLOGY

## 2024-04-25 PROCEDURE — 7100000001 HC PACU RECOVERY - ADDTL 15 MIN: Performed by: OBSTETRICS & GYNECOLOGY

## 2024-04-25 PROCEDURE — 6360000002 HC RX W HCPCS: Performed by: NURSE ANESTHETIST, CERTIFIED REGISTERED

## 2024-04-25 PROCEDURE — 36415 COLL VENOUS BLD VENIPUNCTURE: CPT

## 2024-04-25 PROCEDURE — 86850 RBC ANTIBODY SCREEN: CPT

## 2024-04-25 PROCEDURE — 3700000001 HC ADD 15 MINUTES (ANESTHESIA): Performed by: OBSTETRICS & GYNECOLOGY

## 2024-04-25 PROCEDURE — A4217 STERILE WATER/SALINE, 500 ML: HCPCS | Performed by: OBSTETRICS & GYNECOLOGY

## 2024-04-25 PROCEDURE — 84703 CHORIONIC GONADOTROPIN ASSAY: CPT

## 2024-04-25 PROCEDURE — 86900 BLOOD TYPING SEROLOGIC ABO: CPT

## 2024-04-25 PROCEDURE — 88305 TISSUE EXAM BY PATHOLOGIST: CPT

## 2024-04-25 PROCEDURE — 88307 TISSUE EXAM BY PATHOLOGIST: CPT

## 2024-04-25 PROCEDURE — 85025 COMPLETE CBC W/AUTO DIFF WBC: CPT

## 2024-04-25 PROCEDURE — 2500000003 HC RX 250 WO HCPCS: Performed by: OBSTETRICS & GYNECOLOGY

## 2024-04-25 PROCEDURE — 7100000010 HC PHASE II RECOVERY - FIRST 15 MIN: Performed by: OBSTETRICS & GYNECOLOGY

## 2024-04-25 PROCEDURE — 2709999900 HC NON-CHARGEABLE SUPPLY: Performed by: OBSTETRICS & GYNECOLOGY

## 2024-04-25 PROCEDURE — 2580000003 HC RX 258: Performed by: ANESTHESIOLOGY

## 2024-04-25 PROCEDURE — 3600000003 HC SURGERY LEVEL 3 BASE: Performed by: OBSTETRICS & GYNECOLOGY

## 2024-04-25 PROCEDURE — 86901 BLOOD TYPING SEROLOGIC RH(D): CPT

## 2024-04-25 PROCEDURE — 7100000011 HC PHASE II RECOVERY - ADDTL 15 MIN: Performed by: OBSTETRICS & GYNECOLOGY

## 2024-04-25 RX ORDER — SODIUM CHLORIDE 0.9 % (FLUSH) 0.9 %
5-40 SYRINGE (ML) INJECTION PRN
Status: DISCONTINUED | OUTPATIENT
Start: 2024-04-25 | End: 2024-04-25 | Stop reason: HOSPADM

## 2024-04-25 RX ORDER — SODIUM CHLORIDE 9 MG/ML
INJECTION, SOLUTION INTRAVENOUS PRN
Status: DISCONTINUED | OUTPATIENT
Start: 2024-04-25 | End: 2024-04-25 | Stop reason: HOSPADM

## 2024-04-25 RX ORDER — SODIUM CHLORIDE 0.9 % (FLUSH) 0.9 %
5-40 SYRINGE (ML) INJECTION EVERY 12 HOURS SCHEDULED
Status: DISCONTINUED | OUTPATIENT
Start: 2024-04-25 | End: 2024-04-25 | Stop reason: HOSPADM

## 2024-04-25 RX ORDER — IODINE SOLUTION STRONG 5% (LUGOL'S) 5 %
SOLUTION ORAL PRN
Status: DISCONTINUED | OUTPATIENT
Start: 2024-04-25 | End: 2024-04-25 | Stop reason: ALTCHOICE

## 2024-04-25 RX ORDER — PROCHLORPERAZINE EDISYLATE 5 MG/ML
5 INJECTION INTRAMUSCULAR; INTRAVENOUS
Status: DISCONTINUED | OUTPATIENT
Start: 2024-04-25 | End: 2024-04-25 | Stop reason: HOSPADM

## 2024-04-25 RX ORDER — SODIUM CHLORIDE, SODIUM LACTATE, POTASSIUM CHLORIDE, CALCIUM CHLORIDE 600; 310; 30; 20 MG/100ML; MG/100ML; MG/100ML; MG/100ML
INJECTION, SOLUTION INTRAVENOUS CONTINUOUS
Status: DISCONTINUED | OUTPATIENT
Start: 2024-04-25 | End: 2024-04-25 | Stop reason: HOSPADM

## 2024-04-25 RX ORDER — MIDAZOLAM HYDROCHLORIDE 1 MG/ML
2 INJECTION INTRAMUSCULAR; INTRAVENOUS
Status: DISCONTINUED | OUTPATIENT
Start: 2024-04-25 | End: 2024-04-25 | Stop reason: HOSPADM

## 2024-04-25 RX ORDER — NALOXONE HYDROCHLORIDE 0.4 MG/ML
INJECTION, SOLUTION INTRAMUSCULAR; INTRAVENOUS; SUBCUTANEOUS PRN
Status: DISCONTINUED | OUTPATIENT
Start: 2024-04-25 | End: 2024-04-25 | Stop reason: HOSPADM

## 2024-04-25 RX ORDER — ACETIC ACID 0.25 G/100ML
IRRIGANT IRRIGATION PRN
Status: DISCONTINUED | OUTPATIENT
Start: 2024-04-25 | End: 2024-04-25 | Stop reason: ALTCHOICE

## 2024-04-25 RX ORDER — PROPOFOL 10 MG/ML
INJECTION, EMULSION INTRAVENOUS PRN
Status: DISCONTINUED | OUTPATIENT
Start: 2024-04-25 | End: 2024-04-25 | Stop reason: SDUPTHER

## 2024-04-25 RX ORDER — MAGNESIUM HYDROXIDE 1200 MG/15ML
LIQUID ORAL CONTINUOUS PRN
Status: COMPLETED | OUTPATIENT
Start: 2024-04-25 | End: 2024-04-25

## 2024-04-25 RX ORDER — MIDAZOLAM HYDROCHLORIDE 1 MG/ML
INJECTION INTRAMUSCULAR; INTRAVENOUS PRN
Status: DISCONTINUED | OUTPATIENT
Start: 2024-04-25 | End: 2024-04-25 | Stop reason: SDUPTHER

## 2024-04-25 RX ORDER — ONDANSETRON 2 MG/ML
4 INJECTION INTRAMUSCULAR; INTRAVENOUS
Status: DISCONTINUED | OUTPATIENT
Start: 2024-04-25 | End: 2024-04-25 | Stop reason: HOSPADM

## 2024-04-25 RX ORDER — LIDOCAINE HYDROCHLORIDE 20 MG/ML
INJECTION, SOLUTION EPIDURAL; INFILTRATION; INTRACAUDAL; PERINEURAL PRN
Status: DISCONTINUED | OUTPATIENT
Start: 2024-04-25 | End: 2024-04-25 | Stop reason: SDUPTHER

## 2024-04-25 RX ORDER — HYDROMORPHONE HYDROCHLORIDE 1 MG/ML
0.5 INJECTION, SOLUTION INTRAMUSCULAR; INTRAVENOUS; SUBCUTANEOUS EVERY 5 MIN PRN
Status: DISCONTINUED | OUTPATIENT
Start: 2024-04-25 | End: 2024-04-25 | Stop reason: HOSPADM

## 2024-04-25 RX ORDER — OXYCODONE HYDROCHLORIDE 5 MG/1
10 TABLET ORAL PRN
Status: DISCONTINUED | OUTPATIENT
Start: 2024-04-25 | End: 2024-04-25 | Stop reason: HOSPADM

## 2024-04-25 RX ORDER — IPRATROPIUM BROMIDE AND ALBUTEROL SULFATE 2.5; .5 MG/3ML; MG/3ML
1 SOLUTION RESPIRATORY (INHALATION)
Status: DISCONTINUED | OUTPATIENT
Start: 2024-04-25 | End: 2024-04-25 | Stop reason: HOSPADM

## 2024-04-25 RX ORDER — HYDROMORPHONE HYDROCHLORIDE 1 MG/ML
0.25 INJECTION, SOLUTION INTRAMUSCULAR; INTRAVENOUS; SUBCUTANEOUS EVERY 5 MIN PRN
Status: DISCONTINUED | OUTPATIENT
Start: 2024-04-25 | End: 2024-04-25 | Stop reason: HOSPADM

## 2024-04-25 RX ORDER — LIDOCAINE HYDROCHLORIDE AND EPINEPHRINE 10; 10 MG/ML; UG/ML
INJECTION, SOLUTION INFILTRATION; PERINEURAL PRN
Status: DISCONTINUED | OUTPATIENT
Start: 2024-04-25 | End: 2024-04-25 | Stop reason: ALTCHOICE

## 2024-04-25 RX ORDER — OXYCODONE HYDROCHLORIDE 5 MG/1
5 TABLET ORAL
Status: DISCONTINUED | OUTPATIENT
Start: 2024-04-25 | End: 2024-04-25 | Stop reason: HOSPADM

## 2024-04-25 RX ORDER — MEPERIDINE HYDROCHLORIDE 50 MG/ML
12.5 INJECTION INTRAMUSCULAR; INTRAVENOUS; SUBCUTANEOUS EVERY 5 MIN PRN
Status: DISCONTINUED | OUTPATIENT
Start: 2024-04-25 | End: 2024-04-25 | Stop reason: HOSPADM

## 2024-04-25 RX ADMIN — SODIUM CHLORIDE, POTASSIUM CHLORIDE, SODIUM LACTATE AND CALCIUM CHLORIDE: 600; 310; 30; 20 INJECTION, SOLUTION INTRAVENOUS at 10:04

## 2024-04-25 RX ADMIN — LIDOCAINE HYDROCHLORIDE 70 MG: 20 INJECTION, SOLUTION EPIDURAL; INFILTRATION; INTRACAUDAL; PERINEURAL at 10:34

## 2024-04-25 RX ADMIN — PROPOFOL 140 MG: 10 INJECTION, EMULSION INTRAVENOUS at 10:34

## 2024-04-25 RX ADMIN — MIDAZOLAM 5 MG: 1 INJECTION INTRAMUSCULAR; INTRAVENOUS at 10:28

## 2024-04-25 ASSESSMENT — PAIN SCALES - GENERAL
PAINLEVEL_OUTOF10: 0

## 2024-04-25 ASSESSMENT — LIFESTYLE VARIABLES: SMOKING_STATUS: 1

## 2024-04-25 ASSESSMENT — PAIN SCALES - WONG BAKER: WONGBAKER_NUMERICALRESPONSE: NO HURT

## 2024-04-25 ASSESSMENT — PAIN - FUNCTIONAL ASSESSMENT: PAIN_FUNCTIONAL_ASSESSMENT: 0-10

## 2024-04-25 NOTE — INTERVAL H&P NOTE
Update History & Physical    The patient's History and Physical of March 27, 2024 was reviewed with the patient and I examined the patient. There was no change. The surgical site was confirmed by the patient and me. Discussed with patient possibility of cutting off strings as well as possible need for surgery for IUD removal.     Plan: The risks, benefits, expected outcome, and alternative to the recommended procedure have been discussed with the patient. Patient understands and wants to proceed with the procedure.     Electronically signed by Alka Smith DO on 4/25/2024 at 10:27 AM

## 2024-04-25 NOTE — ANESTHESIA POSTPROCEDURE EVALUATION
Department of Anesthesiology  Postprocedure Note    Patient: Jesus Olivas  MRN: [id number]  YOB: 1975  Date of evaluation: 4/25/2024    Procedure Summary       Date: 04/25/24 Room / Location: [address]    Anesthesia Start: 1030 Anesthesia Stop: 1117    Procedure: LOOP ELECTROSURGICAL EXCISION PROCEDURE (Vagina ) Diagnosis:       High grade squamous intraepithelial cervical dysplasia      (High grade squamous intraepithelial cervical dysplasia [R87.613])    Surgeons: Alka Smith DO Responsible Provider: Melvin Eli MD    Anesthesia Type: general ASA Status: 2            Anesthesia Type: No value filed.    Mathew Phase I: Mathew Score: 9    Mathew Phase II: Mathew Score: 10    Anesthesia Post Evaluation    Patient location during evaluation: PACU  Patient participation: complete - patient participated  Level of consciousness: awake and alert  Airway patency: patent  Nausea & Vomiting: no nausea and no vomiting  Cardiovascular status: hemodynamically stable  Respiratory status: acceptable  Hydration status: euvolemic  Pain management: adequate    No notable events documented.

## 2024-04-25 NOTE — PROGRESS NOTES
Patient arrived to PACU bay 6, phase one initiated. Placed on bedside monitor, VSS. Report obtained from OR RN and anesthesia. Patient on O2 via nasal cannula at 2L. Assessment WNL. Warm blankets applied. Side rails in place, will monitor patient closely.

## 2024-04-25 NOTE — DISCHARGE INSTR - ACTIVITY
Anyone who has recently undergone a surgery should watch out for signs of infection and other complications. These signs include:  a fever over 100.4° F   severe pain   leaking urine   heavy vaginal bleeding, such as bleeding that soaks a maxi pad in 1 hour   large clots   bad-smelling discharge oozing from the incision   severe or continual bleeding from the incision   swelling around the incision site   swelling or pain in the lower legs   pain when peeing   vomiting, diarrhea, or nausea   shortness of breath   hives or a rash   an intense headache that comes on suddenly and does not go away   unexplained anxiety, depression, or panic   flu-like symptoms    Avoid lifting more than 10 to 15 pounds in the first two weeks    Avoid sexual intercourse, vaginal tampons, douching or fingers in the vagina for at least 2 weeks (also called \"pelvic rest\")

## 2024-04-25 NOTE — ANESTHESIA PRE PROCEDURE
Provider, MD Violette       Current medications:    No current facility-administered medications for this encounter.       Allergies:    Allergies   Allergen Reactions   • Bee Venom Anaphylaxis   • Aloe Other (See Comments)   • Glycerin Other (See Comments)   • Wheat Germ Oil Hives   • Codeine Nausea And Vomiting       Problem List:    Patient Active Problem List   Diagnosis Code   • Migraines G43.909   • Migraine with aura, with intractable migraine, so stated, with status migrainosus G43.111   • Chronic tension-type headache, intractable G44.221   • Primary insomnia F51.01   • Dysthymia F34.1   • High grade squamous intraepithelial cervical dysplasia R87.613       Past Medical History:        Diagnosis Date   • Abnormal Pap smear of cervix     LEEP/ hormome supression/cone biposy   • Anxiety    • Cancer (HCC)     cervical   • Depression    • Migraine        Past Surgical History:        Procedure Laterality Date   • APPENDECTOMY  08/2012   • BREAST LUMPECTOMY Left    • BUNIONECTOMY Right 2020   • CYST REMOVAL N/A 1994    l index   • HERNIA REPAIR  10/2012   • KNEE ARTHROSCOPY Bilateral 91,92   • LEEP     • CLAUDIO STEROTACTIC LOC BREAST BIOPSY RIGHT Right 01/22/2024    CLAUDIO STEROTACTIC [facility] [place]   • SMALL INTESTINE SURGERY  2015    partial volvulus-resected       Social History:    Social History     Tobacco Use   • Smoking status: Every Day     Current packs/day: 0.50     Average packs/day: 0.5 packs/day for 20.0 years (10.0 ttl pk-yrs)     Types: Cigarettes   • Smokeless tobacco: Never   Substance Use Topics   • Alcohol use: Yes     Alcohol/week: 3.0 standard drinks of alcohol     Types: 3 Glasses of wine per week                                Ready to quit: Not Answered  Counseling given: Not Answered      Vital Signs (Current):   Vitals:    04/18/24 1351 04/25/24 0931   BP:  (!) 161/114   Pulse:  70   Resp:  16   Temp:  98.7 °F (37.1 °C)   TempSrc:  Oral   SpO2:

## 2024-04-25 NOTE — OP NOTE
OPERATIVE NOTE    Pre-operative Diagnosis:  Pre-Op Diagnosis Codes:     * High grade squamous intraepithelial cervical dysplasia [R87.613]     Post-operative Diagnosis:  Post-Op Diagnosis Codes:     * High grade squamous intraepithelial cervical dysplasia [R87.613]     Surgeon:  Surgeon(s):  Alka Smith DO      Assistant(s):  Surgical Assistant: Anabelle Dawson     Anesthesia:  general    Estimated Blood Loss:   less than 50ml    Drains:    none    ABX:     none    Specimens:     ID Type Source Tests Collected by Time Destination   A : CERVICAL CONIZATION LEEP SPECIMEN, FRAGMENT AT 1200, OTHER PORTION WITH ENTRY POINT AT 1200 Tissue Tissue SURGICAL PATHOLOGY Alka Smith,  4/25/2024 1058    B : ENDOCERVICAL CURETTINGS Tissue Tissue SURGICAL PATHOLOGY Alka Smith,  4/25/2024 1100        Findings: normal external female genitalia, no visible defect noted     Complications: none    Detailed Description of Procedure:   Patient was taken to the operating room where general anesthesia was administered and found to be adequate.  Placed on the operating table in the dorsal lithotomy position and was prepped and draped in sterile fashion.  Universal timeout was conducted and all parties agreed to accurate information.    A powder coated speculum was placed in the vagina exposing the entirety of the cervix and IUD strings were visible. Examination was consistent with history of previous LEEPs. Using single tooth tenaculum, anterior lip of the cervix was grasped and brought into a neutral position. The cervix was then washed with Lugol's solution. There were no areas of Lugol's iodine defect. The cervix was unremarkable with no visible lesions. The cervix was injected with 1% lidocaine with epinephrine. A standard size medium Franco loop-electrode was chosen. With the bovie set to 40/40 a 12 o'clock entry point was used and the Beltran loop electrode was rotated 360 degrees. An

## 2024-05-08 ENCOUNTER — OFFICE VISIT (OUTPATIENT)
Dept: OBGYN CLINIC | Age: 49
End: 2024-05-08
Payer: COMMERCIAL

## 2024-05-08 VITALS
SYSTOLIC BLOOD PRESSURE: 142 MMHG | HEIGHT: 67 IN | DIASTOLIC BLOOD PRESSURE: 86 MMHG | WEIGHT: 145.4 LBS | BODY MASS INDEX: 22.82 KG/M2 | TEMPERATURE: 97.3 F | HEART RATE: 78 BPM

## 2024-05-08 DIAGNOSIS — Z09 POSTOP CHECK: Primary | ICD-10-CM

## 2024-05-08 PROCEDURE — 99213 OFFICE O/P EST LOW 20 MIN: CPT | Performed by: OBSTETRICS & GYNECOLOGY

## 2024-05-08 NOTE — PROGRESS NOTES
Outpatient Postoperative Visit     CC:   Chief Complaint   Patient presents with    Follow-up     S/p leep, heavy menses, dysmenorrhea        Subjective:  48 y.o.  here for evaluation s/p LEEP on 2024.     Pt seen and examined. Doing well. No concerns or complaints. Pain well controlled.  Reports flatus. Regular bowel movements. Urinating and ambulating without difficulty.     Review of Systems - The following ROS was otherwise negative, except as noted in the HPI: constitutional, respiratory, cardiovascular, gastrointestinal, genitourinary.    Objective:  BP (!) 142/86 (Site: Right Upper Arm, Position: Sitting, Cuff Size: Medium Adult)   Pulse 78   Temp 97.3 °F (36.3 °C) (Infrared)   Ht 1.702 m (5' 7\")   Wt 66 kg (145 lb 6.4 oz)   LMP 2024 (Exact Date)   BMI 22.77 kg/m²   General: Alert, well appearing, no acute distress  Abdomen: Soft, appropriately tender to palpation, non-distended  : cervix well healing   Extremities: No redness    Path:   Cervix, cone excision:   -Cervical transformation zone mucosa with no significant histopathologic   change.   -Negative for dysplasia or koilocytic atypia.     B.  Endocervix, curettage:   -Strips of benign endocervical epithelium and lower uterine segment   endometrium.   -Negative for dysplasia or koilocytic atypia.     Assessment/Plan:   Diagnosis Orders   1. Postop check          Reviewed pap smear, colposcopy as well as LEEP pathology.   Discussed with patient chance there could be lesion not removed by LEEP.   Discussed management including hysterectomy vs repeat pap smear in 6 months. Patient interested in repeat pap smear in 6 months   Will have patient return to clinic for repeat pap smear in 6 months   All questions answered and patient verbalized understanding of plan.     Follow Up  No follow-ups on file.    Alka Smith,

## 2024-06-11 ENCOUNTER — HOSPITAL ENCOUNTER (INPATIENT)
Age: 49
LOS: 2 days | Discharge: HOME | DRG: 854 | End: 2024-06-13
Attending: STUDENT IN AN ORGANIZED HEALTH CARE EDUCATION/TRAINING PROGRAM | Admitting: STUDENT IN AN ORGANIZED HEALTH CARE EDUCATION/TRAINING PROGRAM
Payer: COMMERCIAL

## 2024-06-11 ENCOUNTER — ANESTHESIA EVENT (OUTPATIENT)
Dept: OPERATING ROOM | Age: 49
DRG: 854 | End: 2024-06-11
Payer: COMMERCIAL

## 2024-06-11 ENCOUNTER — APPOINTMENT (OUTPATIENT)
Dept: CT IMAGING | Age: 49
DRG: 854 | End: 2024-06-11
Payer: COMMERCIAL

## 2024-06-11 DIAGNOSIS — N20.1 LEFT URETERAL CALCULUS: ICD-10-CM

## 2024-06-11 DIAGNOSIS — D72.829 LEUKOCYTOSIS, UNSPECIFIED TYPE: ICD-10-CM

## 2024-06-11 DIAGNOSIS — N10 ACUTE PYELONEPHRITIS: Primary | ICD-10-CM

## 2024-06-11 LAB
ALBUMIN SERPL-MCNC: 4.3 G/DL (ref 3.4–5)
ALBUMIN/GLOB SERPL: 1.5 {RATIO} (ref 1.1–2.2)
ALP SERPL-CCNC: 70 U/L (ref 40–129)
ALT SERPL-CCNC: 12 U/L (ref 10–40)
ANION GAP SERPL CALCULATED.3IONS-SCNC: 11 MMOL/L (ref 3–16)
AST SERPL-CCNC: 17 U/L (ref 15–37)
BACTERIA URNS QL MICRO: ABNORMAL /HPF
BASOPHILS # BLD: 0.1 K/UL (ref 0–0.2)
BASOPHILS NFR BLD: 0.3 %
BILIRUB SERPL-MCNC: 0.6 MG/DL (ref 0–1)
BILIRUB UR QL STRIP.AUTO: NEGATIVE
BUN SERPL-MCNC: 14 MG/DL (ref 7–20)
CALCIUM SERPL-MCNC: 10 MG/DL (ref 8.3–10.6)
CHLORIDE SERPL-SCNC: 103 MMOL/L (ref 99–110)
CLARITY UR: ABNORMAL
CO2 SERPL-SCNC: 22 MMOL/L (ref 21–32)
COLOR UR: YELLOW
CREAT SERPL-MCNC: 0.6 MG/DL (ref 0.6–1.1)
DEPRECATED RDW RBC AUTO: 14.2 % (ref 12.4–15.4)
EOSINOPHIL # BLD: 0.1 K/UL (ref 0–0.6)
EOSINOPHIL NFR BLD: 0.5 %
EPI CELLS #/AREA URNS HPF: ABNORMAL /HPF (ref 0–5)
GFR SERPLBLD CREATININE-BSD FMLA CKD-EPI: >90 ML/MIN/{1.73_M2}
GLUCOSE SERPL-MCNC: 111 MG/DL (ref 70–99)
GLUCOSE UR STRIP.AUTO-MCNC: NEGATIVE MG/DL
HCG SERPL QL: NEGATIVE
HCT VFR BLD AUTO: 48.1 % (ref 36–48)
HGB BLD-MCNC: 16 G/DL (ref 12–16)
HGB UR QL STRIP.AUTO: ABNORMAL
KETONES UR STRIP.AUTO-MCNC: ABNORMAL MG/DL
LACTATE BLDV-SCNC: 0.7 MMOL/L (ref 0.4–1.9)
LACTATE BLDV-SCNC: 0.9 MMOL/L (ref 0.4–1.9)
LEUKOCYTE ESTERASE UR QL STRIP.AUTO: ABNORMAL
LIPASE SERPL-CCNC: 21 U/L (ref 13–60)
LYMPHOCYTES # BLD: 1.7 K/UL (ref 1–5.1)
LYMPHOCYTES NFR BLD: 9.8 %
MCH RBC QN AUTO: 31.7 PG (ref 26–34)
MCHC RBC AUTO-ENTMCNC: 33.2 G/DL (ref 31–36)
MCV RBC AUTO: 95.5 FL (ref 80–100)
MONOCYTES # BLD: 0.9 K/UL (ref 0–1.3)
MONOCYTES NFR BLD: 5.4 %
MUCOUS THREADS #/AREA URNS LPF: ABNORMAL /LPF
NEUTROPHILS # BLD: 14.8 K/UL (ref 1.7–7.7)
NEUTROPHILS NFR BLD: 84 %
NITRITE UR QL STRIP.AUTO: POSITIVE
PH UR STRIP.AUTO: 6 [PH] (ref 5–8)
PLATELET # BLD AUTO: 233 K/UL (ref 135–450)
PMV BLD AUTO: 9 FL (ref 5–10.5)
POTASSIUM SERPL-SCNC: 4 MMOL/L (ref 3.5–5.1)
PROT SERPL-MCNC: 7.2 G/DL (ref 6.4–8.2)
PROT UR STRIP.AUTO-MCNC: 30 MG/DL
RBC # BLD AUTO: 5.03 M/UL (ref 4–5.2)
RBC #/AREA URNS HPF: ABNORMAL /HPF (ref 0–4)
SODIUM SERPL-SCNC: 136 MMOL/L (ref 136–145)
SP GR UR STRIP.AUTO: >=1.03 (ref 1–1.03)
UA COMPLETE W REFLEX CULTURE PNL UR: YES
UA DIPSTICK W REFLEX MICRO PNL UR: YES
URN SPEC COLLECT METH UR: ABNORMAL
UROBILINOGEN UR STRIP-ACNC: 0.2 E.U./DL
WBC # BLD AUTO: 17.7 K/UL (ref 4–11)
WBC #/AREA URNS HPF: ABNORMAL /HPF (ref 0–5)

## 2024-06-11 PROCEDURE — 74177 CT ABD & PELVIS W/CONTRAST: CPT

## 2024-06-11 PROCEDURE — 96374 THER/PROPH/DIAG INJ IV PUSH: CPT

## 2024-06-11 PROCEDURE — 6360000002 HC RX W HCPCS: Performed by: STUDENT IN AN ORGANIZED HEALTH CARE EDUCATION/TRAINING PROGRAM

## 2024-06-11 PROCEDURE — 85025 COMPLETE CBC W/AUTO DIFF WBC: CPT

## 2024-06-11 PROCEDURE — 99285 EMERGENCY DEPT VISIT HI MDM: CPT

## 2024-06-11 PROCEDURE — 6370000000 HC RX 637 (ALT 250 FOR IP): Performed by: STUDENT IN AN ORGANIZED HEALTH CARE EDUCATION/TRAINING PROGRAM

## 2024-06-11 PROCEDURE — 6360000004 HC RX CONTRAST MEDICATION: Performed by: STUDENT IN AN ORGANIZED HEALTH CARE EDUCATION/TRAINING PROGRAM

## 2024-06-11 PROCEDURE — 2580000003 HC RX 258: Performed by: STUDENT IN AN ORGANIZED HEALTH CARE EDUCATION/TRAINING PROGRAM

## 2024-06-11 PROCEDURE — 80053 COMPREHEN METABOLIC PANEL: CPT

## 2024-06-11 PROCEDURE — 87040 BLOOD CULTURE FOR BACTERIA: CPT

## 2024-06-11 PROCEDURE — 96375 TX/PRO/DX INJ NEW DRUG ADDON: CPT

## 2024-06-11 PROCEDURE — 83690 ASSAY OF LIPASE: CPT

## 2024-06-11 PROCEDURE — 83605 ASSAY OF LACTIC ACID: CPT

## 2024-06-11 PROCEDURE — 87186 SC STD MICRODIL/AGAR DIL: CPT

## 2024-06-11 PROCEDURE — 87088 URINE BACTERIA CULTURE: CPT

## 2024-06-11 PROCEDURE — 6370000000 HC RX 637 (ALT 250 FOR IP): Performed by: NURSE PRACTITIONER

## 2024-06-11 PROCEDURE — 87086 URINE CULTURE/COLONY COUNT: CPT

## 2024-06-11 PROCEDURE — 1200000000 HC SEMI PRIVATE

## 2024-06-11 PROCEDURE — 81001 URINALYSIS AUTO W/SCOPE: CPT

## 2024-06-11 PROCEDURE — 84703 CHORIONIC GONADOTROPIN ASSAY: CPT

## 2024-06-11 PROCEDURE — 87150 DNA/RNA AMPLIFIED PROBE: CPT

## 2024-06-11 PROCEDURE — 36415 COLL VENOUS BLD VENIPUNCTURE: CPT

## 2024-06-11 RX ORDER — ENOXAPARIN SODIUM 100 MG/ML
40 INJECTION SUBCUTANEOUS DAILY
Status: DISCONTINUED | OUTPATIENT
Start: 2024-06-11 | End: 2024-06-13 | Stop reason: HOSPADM

## 2024-06-11 RX ORDER — KETOROLAC TROMETHAMINE 30 MG/ML
15 INJECTION, SOLUTION INTRAMUSCULAR; INTRAVENOUS EVERY 6 HOURS
Status: COMPLETED | OUTPATIENT
Start: 2024-06-11 | End: 2024-06-12

## 2024-06-11 RX ORDER — SODIUM CHLORIDE, SODIUM LACTATE, POTASSIUM CHLORIDE, CALCIUM CHLORIDE 600; 310; 30; 20 MG/100ML; MG/100ML; MG/100ML; MG/100ML
INJECTION, SOLUTION INTRAVENOUS CONTINUOUS
Status: DISCONTINUED | OUTPATIENT
Start: 2024-06-11 | End: 2024-06-13 | Stop reason: HOSPADM

## 2024-06-11 RX ORDER — HYDROMORPHONE HYDROCHLORIDE 1 MG/ML
1 INJECTION, SOLUTION INTRAMUSCULAR; INTRAVENOUS; SUBCUTANEOUS ONCE
Status: COMPLETED | OUTPATIENT
Start: 2024-06-11 | End: 2024-06-11

## 2024-06-11 RX ORDER — ARIPIPRAZOLE 10 MG/1
15 TABLET ORAL DAILY
Status: DISCONTINUED | OUTPATIENT
Start: 2024-06-12 | End: 2024-06-13 | Stop reason: HOSPADM

## 2024-06-11 RX ORDER — TOPIRAMATE 100 MG/1
100 TABLET, FILM COATED ORAL DAILY
Status: DISCONTINUED | OUTPATIENT
Start: 2024-06-12 | End: 2024-06-13 | Stop reason: HOSPADM

## 2024-06-11 RX ORDER — SODIUM CHLORIDE 9 MG/ML
INJECTION, SOLUTION INTRAVENOUS PRN
Status: DISCONTINUED | OUTPATIENT
Start: 2024-06-11 | End: 2024-06-13 | Stop reason: HOSPADM

## 2024-06-11 RX ORDER — ONDANSETRON 4 MG/1
4 TABLET, ORALLY DISINTEGRATING ORAL EVERY 8 HOURS PRN
Status: DISCONTINUED | OUTPATIENT
Start: 2024-06-11 | End: 2024-06-13 | Stop reason: HOSPADM

## 2024-06-11 RX ORDER — ONDANSETRON 2 MG/ML
4 INJECTION INTRAMUSCULAR; INTRAVENOUS ONCE
Status: COMPLETED | OUTPATIENT
Start: 2024-06-11 | End: 2024-06-11

## 2024-06-11 RX ORDER — OXYCODONE HYDROCHLORIDE AND ACETAMINOPHEN 5; 325 MG/1; MG/1
1 TABLET ORAL EVERY 4 HOURS PRN
Status: DISCONTINUED | OUTPATIENT
Start: 2024-06-11 | End: 2024-06-12

## 2024-06-11 RX ORDER — MORPHINE SULFATE 4 MG/ML
4 INJECTION, SOLUTION INTRAMUSCULAR; INTRAVENOUS ONCE
Status: COMPLETED | OUTPATIENT
Start: 2024-06-11 | End: 2024-06-11

## 2024-06-11 RX ORDER — AMITRIPTYLINE HYDROCHLORIDE 10 MG/1
10 TABLET, FILM COATED ORAL NIGHTLY
Status: DISCONTINUED | OUTPATIENT
Start: 2024-06-11 | End: 2024-06-13 | Stop reason: HOSPADM

## 2024-06-11 RX ORDER — 0.9 % SODIUM CHLORIDE 0.9 %
30 INTRAVENOUS SOLUTION INTRAVENOUS ONCE
Status: COMPLETED | OUTPATIENT
Start: 2024-06-11 | End: 2024-06-11

## 2024-06-11 RX ORDER — ONDANSETRON 2 MG/ML
4 INJECTION INTRAMUSCULAR; INTRAVENOUS EVERY 6 HOURS PRN
Status: DISCONTINUED | OUTPATIENT
Start: 2024-06-11 | End: 2024-06-13 | Stop reason: HOSPADM

## 2024-06-11 RX ORDER — KETOROLAC TROMETHAMINE 30 MG/ML
15 INJECTION, SOLUTION INTRAMUSCULAR; INTRAVENOUS ONCE
Status: COMPLETED | OUTPATIENT
Start: 2024-06-11 | End: 2024-06-11

## 2024-06-11 RX ORDER — TAMSULOSIN HYDROCHLORIDE 0.4 MG/1
0.4 CAPSULE ORAL DAILY
Status: DISCONTINUED | OUTPATIENT
Start: 2024-06-11 | End: 2024-06-13 | Stop reason: HOSPADM

## 2024-06-11 RX ORDER — SODIUM CHLORIDE 0.9 % (FLUSH) 0.9 %
5-40 SYRINGE (ML) INJECTION PRN
Status: DISCONTINUED | OUTPATIENT
Start: 2024-06-11 | End: 2024-06-13 | Stop reason: HOSPADM

## 2024-06-11 RX ORDER — 0.9 % SODIUM CHLORIDE 0.9 %
1000 INTRAVENOUS SOLUTION INTRAVENOUS ONCE
Status: DISCONTINUED | OUTPATIENT
Start: 2024-06-11 | End: 2024-06-11

## 2024-06-11 RX ORDER — POLYETHYLENE GLYCOL 3350 17 G/17G
17 POWDER, FOR SOLUTION ORAL DAILY PRN
Status: DISCONTINUED | OUTPATIENT
Start: 2024-06-11 | End: 2024-06-13 | Stop reason: HOSPADM

## 2024-06-11 RX ORDER — SODIUM CHLORIDE 0.9 % (FLUSH) 0.9 %
5-40 SYRINGE (ML) INJECTION EVERY 12 HOURS SCHEDULED
Status: DISCONTINUED | OUTPATIENT
Start: 2024-06-11 | End: 2024-06-13 | Stop reason: HOSPADM

## 2024-06-11 RX ADMIN — ENOXAPARIN SODIUM 40 MG: 100 INJECTION SUBCUTANEOUS at 19:04

## 2024-06-11 RX ADMIN — MORPHINE SULFATE 4 MG: 4 INJECTION, SOLUTION INTRAMUSCULAR; INTRAVENOUS at 14:40

## 2024-06-11 RX ADMIN — AMITRIPTYLINE HYDROCHLORIDE 10 MG: 10 TABLET, FILM COATED ORAL at 20:03

## 2024-06-11 RX ADMIN — CEFTRIAXONE SODIUM 1000 MG: 1 INJECTION, POWDER, FOR SOLUTION INTRAMUSCULAR; INTRAVENOUS at 15:42

## 2024-06-11 RX ADMIN — IOPAMIDOL 75 ML: 755 INJECTION, SOLUTION INTRAVENOUS at 14:34

## 2024-06-11 RX ADMIN — TAMSULOSIN HYDROCHLORIDE 0.4 MG: 0.4 CAPSULE ORAL at 19:05

## 2024-06-11 RX ADMIN — KETOROLAC TROMETHAMINE 15 MG: 30 INJECTION INTRAMUSCULAR; INTRAVENOUS at 13:50

## 2024-06-11 RX ADMIN — HYDROMORPHONE HYDROCHLORIDE 0.5 MG: 1 INJECTION, SOLUTION INTRAMUSCULAR; INTRAVENOUS; SUBCUTANEOUS at 23:23

## 2024-06-11 RX ADMIN — HYDROMORPHONE HYDROCHLORIDE 1 MG: 1 INJECTION, SOLUTION INTRAMUSCULAR; INTRAVENOUS; SUBCUTANEOUS at 15:42

## 2024-06-11 RX ADMIN — KETOROLAC TROMETHAMINE 15 MG: 30 INJECTION INTRAMUSCULAR; INTRAVENOUS at 20:03

## 2024-06-11 RX ADMIN — SODIUM CHLORIDE 1974 ML: 9 INJECTION, SOLUTION INTRAVENOUS at 14:40

## 2024-06-11 RX ADMIN — HYDROMORPHONE HYDROCHLORIDE 0.5 MG: 1 INJECTION, SOLUTION INTRAMUSCULAR; INTRAVENOUS; SUBCUTANEOUS at 19:59

## 2024-06-11 RX ADMIN — ONDANSETRON 4 MG: 2 INJECTION INTRAMUSCULAR; INTRAVENOUS at 13:50

## 2024-06-11 RX ADMIN — SODIUM CHLORIDE, POTASSIUM CHLORIDE, SODIUM LACTATE AND CALCIUM CHLORIDE: 600; 310; 30; 20 INJECTION, SOLUTION INTRAVENOUS at 19:04

## 2024-06-11 RX ADMIN — OXYCODONE AND ACETAMINOPHEN 1 TABLET: 5; 325 TABLET ORAL at 22:25

## 2024-06-11 ASSESSMENT — PAIN DESCRIPTION - DESCRIPTORS
DESCRIPTORS: ACHING;SORE;SHARP
DESCRIPTORS: ACHING;SHARP
DESCRIPTORS: SHARP
DESCRIPTORS: SHARP

## 2024-06-11 ASSESSMENT — PAIN DESCRIPTION - ORIENTATION
ORIENTATION: LEFT

## 2024-06-11 ASSESSMENT — ENCOUNTER SYMPTOMS
SINUS PRESSURE: 0
BACK PAIN: 0
NAUSEA: 1
VOMITING: 0
DIARRHEA: 0
NAUSEA: 0
CONSTIPATION: 0
ABDOMINAL PAIN: 1
DIARRHEA: 1
ABDOMINAL PAIN: 0
SINUS PAIN: 0
COUGH: 0
COLOR CHANGE: 0
SORE THROAT: 0
SHORTNESS OF BREATH: 0
WHEEZING: 0

## 2024-06-11 ASSESSMENT — PAIN DESCRIPTION - LOCATION
LOCATION: HEAD
LOCATION: ABDOMEN
LOCATION: FLANK;BACK
LOCATION: BACK;FLANK
LOCATION: HEAD
LOCATION: FLANK

## 2024-06-11 ASSESSMENT — PAIN SCALES - GENERAL
PAINLEVEL_OUTOF10: 4
PAINLEVEL_OUTOF10: 8
PAINLEVEL_OUTOF10: 0
PAINLEVEL_OUTOF10: 6
PAINLEVEL_OUTOF10: 8
PAINLEVEL_OUTOF10: 10
PAINLEVEL_OUTOF10: 0
PAINLEVEL_OUTOF10: 4
PAINLEVEL_OUTOF10: 4
PAINLEVEL_OUTOF10: 8
PAINLEVEL_OUTOF10: 10

## 2024-06-11 ASSESSMENT — PAIN - FUNCTIONAL ASSESSMENT
PAIN_FUNCTIONAL_ASSESSMENT: 0-10
PAIN_FUNCTIONAL_ASSESSMENT: 0-10
PAIN_FUNCTIONAL_ASSESSMENT: ACTIVITIES ARE NOT PREVENTED
PAIN_FUNCTIONAL_ASSESSMENT: ACTIVITIES ARE NOT PREVENTED

## 2024-06-11 ASSESSMENT — LIFESTYLE VARIABLES
HOW OFTEN DO YOU HAVE A DRINK CONTAINING ALCOHOL: MONTHLY OR LESS
HOW MANY STANDARD DRINKS CONTAINING ALCOHOL DO YOU HAVE ON A TYPICAL DAY: 1 OR 2

## 2024-06-11 NOTE — H&P
time. Mental status is at baseline.   Psychiatric:         Mood and Affect: Mood normal.         Behavior: Behavior normal.         Thought Content: Thought content normal.         Judgment: Judgment normal.         Past Medical History:   PMHx   Past Medical History:   Diagnosis Date    Abnormal Pap smear of cervix     LEEP/ hormome supression/cone biposy    Anxiety     Cancer (HCC)     cervical    Depression     Migraine      PSHX:  has a past surgical history that includes Knee arthroscopy (Bilateral, 91,92); cyst removal (N/A, 1994); LEEP; Small intestine surgery (2015); Breast lumpectomy (Left); Appendectomy (08/2012); hernia repair (10/2012); CLAUDIO STEREO BREAST BX W LOC DEVICE 1ST LESION RIGHT (Right, 01/22/2024); Bunionectomy (Right, 2020); and LEEP (N/A, 4/25/2024).  Allergies:   Allergies   Allergen Reactions    Bee Venom Anaphylaxis    Aloe Other (See Comments)    Glycerin Other (See Comments)    Wheat Germ Oil Hives    Codeine Nausea And Vomiting     Fam HX:  family history includes Atrial Fibrillation in her mother; Breast Cancer in her maternal great grandmother; Cancer in her brother and father; Heart Disease in her maternal grandfather and maternal grandmother.  Soc HX:   Social History     Socioeconomic History    Marital status: Single     Spouse name: None    Number of children: None    Years of education: None    Highest education level: None   Tobacco Use    Smoking status: Every Day     Current packs/day: 0.50     Average packs/day: 0.5 packs/day for 20.0 years (10.0 ttl pk-yrs)     Types: Cigarettes    Smokeless tobacco: Never   Vaping Use    Vaping Use: Never used   Substance and Sexual Activity    Alcohol use: Yes     Alcohol/week: 3.0 standard drinks of alcohol     Types: 3 Glasses of wine per week    Drug use: Not Currently     Types: Marijuana (Weed)    Sexual activity: Yes     Partners: Male     Social Determinants of Health     Financial Resource Strain: Medium Risk (9/15/2023)    Overall

## 2024-06-11 NOTE — ED NOTES
@1525 called Urology per    RE: pyelonephritis with 7mm calcification near left UVJ with left hydronephrosis  @1544 Luana called back and spoke to

## 2024-06-11 NOTE — ED PROVIDER NOTES
LESION RIGHT (Right, 01/22/2024); Bunionectomy (Right, 2020); LEEP (N/A, 4/25/2024); and Cystoscopy (Left, 6/12/2024).    Home medications:   Prior to Admission medications    Medication Sig Start Date End Date Taking? Authorizing Provider   sulfamethoxazole-trimethoprim (BACTRIM DS;SEPTRA DS) 800-160 MG per tablet Take 1 tablet by mouth 2 times daily for 10 days 6/13/24 6/23/24 Yes Joyce Polk MD   tamsulosin (FLOMAX) 0.4 MG capsule Take 1 capsule by mouth daily 6/13/24  Yes Joyce Polk MD   amitriptyline (ELAVIL) 10 MG tablet Take 0.5-1 tablets by mouth nightly 3/26/24   Jenny Castro PA   Ubrogepant (UBRELVY) 100 MG TABS Take 100mg tablet by mouth q48h as needed for migraine. MAX dosage 2 tablets a week 1/17/24   Tomasa Elizabeth MD   acetaminophen (TYLENOL) 500 MG tablet Take 1 tablet by mouth every 6 hours as needed for Pain    ProviderViolette MD   DULoxetine (CYMBALTA) 30 MG extended release capsule Take 1 capsule by mouth daily 9/15/23   Jenny Castro PA   DULoxetine (CYMBALTA) 60 MG extended release capsule Take 1 capsule by mouth daily 9/15/23   Jenny Castro PA   amitriptyline (ELAVIL) 50 MG tablet Take 1 tablet by mouth nightly  Patient taking differently: Take 1 tablet by mouth daily 9/15/23   Jenny Castro PA   verapamil (VERELAN PM) 100 MG CP24 extended release capsule Take 1 capsule by mouth daily 9/15/23   Jenny Castro PA   topiramate (TOPAMAX) 100 MG tablet Take 1 tablet by mouth daily 9/15/23   Jenny Castro PA   ARIPiprazole (ABILIFY) 15 MG tablet Take 1 tablet by mouth daily 9/15/23   Jenny Castro PA   calcium carbonate-vitamin D 600-200 MG-UNIT TABS Take by mouth 2 times daily    ProviderViolette MD   EPINEPHrine 1 MG/10ML SOSY injection as needed    ProviderViolette MD       Social history:  reports that she has been smoking cigarettes. She has a 10.0 pack-year smoking history. She has never used smokeless tobacco. She reports current alcohol use of

## 2024-06-12 ENCOUNTER — ANESTHESIA (OUTPATIENT)
Dept: OPERATING ROOM | Age: 49
DRG: 854 | End: 2024-06-12
Payer: COMMERCIAL

## 2024-06-12 ENCOUNTER — APPOINTMENT (OUTPATIENT)
Dept: GENERAL RADIOLOGY | Age: 49
DRG: 854 | End: 2024-06-12
Payer: COMMERCIAL

## 2024-06-12 LAB
ANION GAP SERPL CALCULATED.3IONS-SCNC: 7 MMOL/L (ref 3–16)
BASOPHILS # BLD: 0 K/UL (ref 0–0.2)
BASOPHILS NFR BLD: 0.1 %
BUN SERPL-MCNC: 13 MG/DL (ref 7–20)
CALCIUM SERPL-MCNC: 8.5 MG/DL (ref 8.3–10.6)
CHLORIDE SERPL-SCNC: 107 MMOL/L (ref 99–110)
CO2 SERPL-SCNC: 26 MMOL/L (ref 21–32)
CREAT SERPL-MCNC: 0.7 MG/DL (ref 0.6–1.1)
DEPRECATED RDW RBC AUTO: 14.4 % (ref 12.4–15.4)
EOSINOPHIL # BLD: 0.1 K/UL (ref 0–0.6)
EOSINOPHIL NFR BLD: 0.4 %
GFR SERPLBLD CREATININE-BSD FMLA CKD-EPI: >90 ML/MIN/{1.73_M2}
GLUCOSE SERPL-MCNC: 94 MG/DL (ref 70–99)
HCG UR QL: NEGATIVE
HCT VFR BLD AUTO: 39.3 % (ref 36–48)
HGB BLD-MCNC: 13.2 G/DL (ref 12–16)
LYMPHOCYTES # BLD: 1.7 K/UL (ref 1–5.1)
LYMPHOCYTES NFR BLD: 8.3 %
MCH RBC QN AUTO: 32.4 PG (ref 26–34)
MCHC RBC AUTO-ENTMCNC: 33.7 G/DL (ref 31–36)
MCV RBC AUTO: 96 FL (ref 80–100)
MONOCYTES # BLD: 1.3 K/UL (ref 0–1.3)
MONOCYTES NFR BLD: 6.3 %
NEUTROPHILS # BLD: 17.2 K/UL (ref 1.7–7.7)
NEUTROPHILS NFR BLD: 84.9 %
PLATELET # BLD AUTO: 154 K/UL (ref 135–450)
PMV BLD AUTO: 9.7 FL (ref 5–10.5)
POTASSIUM SERPL-SCNC: 4.6 MMOL/L (ref 3.5–5.1)
RBC # BLD AUTO: 4.09 M/UL (ref 4–5.2)
REPORT: NORMAL
SODIUM SERPL-SCNC: 140 MMOL/L (ref 136–145)
WBC # BLD AUTO: 20.2 K/UL (ref 4–11)

## 2024-06-12 PROCEDURE — 80048 BASIC METABOLIC PNL TOTAL CA: CPT

## 2024-06-12 PROCEDURE — 2500000003 HC RX 250 WO HCPCS: Performed by: NURSE ANESTHETIST, CERTIFIED REGISTERED

## 2024-06-12 PROCEDURE — 87086 URINE CULTURE/COLONY COUNT: CPT

## 2024-06-12 PROCEDURE — C2617 STENT, NON-COR, TEM W/O DEL: HCPCS | Performed by: UROLOGY

## 2024-06-12 PROCEDURE — 2580000003 HC RX 258: Performed by: UROLOGY

## 2024-06-12 PROCEDURE — 3700000001 HC ADD 15 MINUTES (ANESTHESIA): Performed by: UROLOGY

## 2024-06-12 PROCEDURE — 6360000002 HC RX W HCPCS: Performed by: NURSE ANESTHETIST, CERTIFIED REGISTERED

## 2024-06-12 PROCEDURE — 3600000003 HC SURGERY LEVEL 3 BASE: Performed by: UROLOGY

## 2024-06-12 PROCEDURE — 1200000000 HC SEMI PRIVATE

## 2024-06-12 PROCEDURE — 6370000000 HC RX 637 (ALT 250 FOR IP): Performed by: STUDENT IN AN ORGANIZED HEALTH CARE EDUCATION/TRAINING PROGRAM

## 2024-06-12 PROCEDURE — 6370000000 HC RX 637 (ALT 250 FOR IP): Performed by: NURSE PRACTITIONER

## 2024-06-12 PROCEDURE — 85025 COMPLETE CBC W/AUTO DIFF WBC: CPT

## 2024-06-12 PROCEDURE — 3600000013 HC SURGERY LEVEL 3 ADDTL 15MIN: Performed by: UROLOGY

## 2024-06-12 PROCEDURE — C1769 GUIDE WIRE: HCPCS | Performed by: UROLOGY

## 2024-06-12 PROCEDURE — 3700000000 HC ANESTHESIA ATTENDED CARE: Performed by: UROLOGY

## 2024-06-12 PROCEDURE — 36415 COLL VENOUS BLD VENIPUNCTURE: CPT

## 2024-06-12 PROCEDURE — 0T778DZ DILATION OF LEFT URETER WITH INTRALUMINAL DEVICE, VIA NATURAL OR ARTIFICIAL OPENING ENDOSCOPIC: ICD-10-PCS | Performed by: UROLOGY

## 2024-06-12 PROCEDURE — 6360000002 HC RX W HCPCS: Performed by: NURSE PRACTITIONER

## 2024-06-12 PROCEDURE — 2580000003 HC RX 258: Performed by: NURSE ANESTHETIST, CERTIFIED REGISTERED

## 2024-06-12 PROCEDURE — 99222 1ST HOSP IP/OBS MODERATE 55: CPT | Performed by: INTERNAL MEDICINE

## 2024-06-12 PROCEDURE — 2709999900 HC NON-CHARGEABLE SUPPLY: Performed by: UROLOGY

## 2024-06-12 PROCEDURE — 87088 URINE BACTERIA CULTURE: CPT

## 2024-06-12 PROCEDURE — 84703 CHORIONIC GONADOTROPIN ASSAY: CPT

## 2024-06-12 PROCEDURE — 2580000003 HC RX 258: Performed by: STUDENT IN AN ORGANIZED HEALTH CARE EDUCATION/TRAINING PROGRAM

## 2024-06-12 PROCEDURE — 74018 RADEX ABDOMEN 1 VIEW: CPT

## 2024-06-12 PROCEDURE — C1758 CATHETER, URETERAL: HCPCS | Performed by: UROLOGY

## 2024-06-12 PROCEDURE — 87186 SC STD MICRODIL/AGAR DIL: CPT

## 2024-06-12 PROCEDURE — 7100000000 HC PACU RECOVERY - FIRST 15 MIN: Performed by: UROLOGY

## 2024-06-12 PROCEDURE — 7100000001 HC PACU RECOVERY - ADDTL 15 MIN: Performed by: UROLOGY

## 2024-06-12 PROCEDURE — 6360000002 HC RX W HCPCS: Performed by: STUDENT IN AN ORGANIZED HEALTH CARE EDUCATION/TRAINING PROGRAM

## 2024-06-12 DEVICE — URETERAL STENT
Type: IMPLANTABLE DEVICE | Site: URETER | Status: FUNCTIONAL
Brand: CONTOUR™

## 2024-06-12 RX ORDER — HYDROMORPHONE HYDROCHLORIDE 1 MG/ML
1 INJECTION, SOLUTION INTRAMUSCULAR; INTRAVENOUS; SUBCUTANEOUS EVERY 4 HOURS PRN
Status: DISCONTINUED | OUTPATIENT
Start: 2024-06-12 | End: 2024-06-12

## 2024-06-12 RX ORDER — OXYCODONE HYDROCHLORIDE 5 MG/1
5 TABLET ORAL PRN
Status: DISCONTINUED | OUTPATIENT
Start: 2024-06-12 | End: 2024-06-12 | Stop reason: HOSPADM

## 2024-06-12 RX ORDER — ONDANSETRON 2 MG/ML
INJECTION INTRAMUSCULAR; INTRAVENOUS PRN
Status: DISCONTINUED | OUTPATIENT
Start: 2024-06-12 | End: 2024-06-12 | Stop reason: SDUPTHER

## 2024-06-12 RX ORDER — SODIUM CHLORIDE 9 MG/ML
INJECTION, SOLUTION INTRAVENOUS PRN
Status: DISCONTINUED | OUTPATIENT
Start: 2024-06-12 | End: 2024-06-12 | Stop reason: HOSPADM

## 2024-06-12 RX ORDER — SODIUM CHLORIDE 0.9 % (FLUSH) 0.9 %
5-40 SYRINGE (ML) INJECTION EVERY 12 HOURS SCHEDULED
Status: DISCONTINUED | OUTPATIENT
Start: 2024-06-12 | End: 2024-06-12 | Stop reason: HOSPADM

## 2024-06-12 RX ORDER — OXYCODONE HYDROCHLORIDE AND ACETAMINOPHEN 5; 325 MG/1; MG/1
1 TABLET ORAL EVERY 4 HOURS PRN
Status: DISCONTINUED | OUTPATIENT
Start: 2024-06-12 | End: 2024-06-13 | Stop reason: HOSPADM

## 2024-06-12 RX ORDER — DEXAMETHASONE SODIUM PHOSPHATE 4 MG/ML
INJECTION, SOLUTION INTRA-ARTICULAR; INTRALESIONAL; INTRAMUSCULAR; INTRAVENOUS; SOFT TISSUE PRN
Status: DISCONTINUED | OUTPATIENT
Start: 2024-06-12 | End: 2024-06-12 | Stop reason: SDUPTHER

## 2024-06-12 RX ORDER — LIDOCAINE HYDROCHLORIDE 20 MG/ML
INJECTION, SOLUTION INFILTRATION; PERINEURAL PRN
Status: DISCONTINUED | OUTPATIENT
Start: 2024-06-12 | End: 2024-06-12 | Stop reason: SDUPTHER

## 2024-06-12 RX ORDER — ONDANSETRON 2 MG/ML
4 INJECTION INTRAMUSCULAR; INTRAVENOUS EVERY 30 MIN PRN
Status: DISCONTINUED | OUTPATIENT
Start: 2024-06-12 | End: 2024-06-12 | Stop reason: HOSPADM

## 2024-06-12 RX ORDER — OXYCODONE HYDROCHLORIDE 5 MG/1
10 TABLET ORAL PRN
Status: DISCONTINUED | OUTPATIENT
Start: 2024-06-12 | End: 2024-06-12 | Stop reason: HOSPADM

## 2024-06-12 RX ORDER — NALOXONE HYDROCHLORIDE 0.4 MG/ML
INJECTION, SOLUTION INTRAMUSCULAR; INTRAVENOUS; SUBCUTANEOUS PRN
Status: DISCONTINUED | OUTPATIENT
Start: 2024-06-12 | End: 2024-06-12 | Stop reason: HOSPADM

## 2024-06-12 RX ORDER — PROPOFOL 10 MG/ML
INJECTION, EMULSION INTRAVENOUS PRN
Status: DISCONTINUED | OUTPATIENT
Start: 2024-06-12 | End: 2024-06-12 | Stop reason: SDUPTHER

## 2024-06-12 RX ORDER — KETOROLAC TROMETHAMINE 30 MG/ML
INJECTION, SOLUTION INTRAMUSCULAR; INTRAVENOUS PRN
Status: DISCONTINUED | OUTPATIENT
Start: 2024-06-12 | End: 2024-06-12 | Stop reason: SDUPTHER

## 2024-06-12 RX ORDER — SODIUM CHLORIDE, SODIUM LACTATE, POTASSIUM CHLORIDE, CALCIUM CHLORIDE 600; 310; 30; 20 MG/100ML; MG/100ML; MG/100ML; MG/100ML
INJECTION, SOLUTION INTRAVENOUS CONTINUOUS PRN
Status: DISCONTINUED | OUTPATIENT
Start: 2024-06-12 | End: 2024-06-12 | Stop reason: SDUPTHER

## 2024-06-12 RX ORDER — SODIUM CHLORIDE 0.9 % (FLUSH) 0.9 %
5-40 SYRINGE (ML) INJECTION PRN
Status: DISCONTINUED | OUTPATIENT
Start: 2024-06-12 | End: 2024-06-12 | Stop reason: HOSPADM

## 2024-06-12 RX ADMIN — TOPIRAMATE 100 MG: 100 TABLET, FILM COATED ORAL at 08:07

## 2024-06-12 RX ADMIN — AMITRIPTYLINE HYDROCHLORIDE 10 MG: 10 TABLET, FILM COATED ORAL at 21:44

## 2024-06-12 RX ADMIN — KETOROLAC TROMETHAMINE 15 MG: 30 INJECTION INTRAMUSCULAR; INTRAVENOUS at 08:07

## 2024-06-12 RX ADMIN — ONDANSETRON 4 MG: 2 INJECTION INTRAMUSCULAR; INTRAVENOUS at 09:44

## 2024-06-12 RX ADMIN — LIDOCAINE HYDROCHLORIDE 60 MG: 20 INJECTION, SOLUTION INFILTRATION; PERINEURAL at 09:41

## 2024-06-12 RX ADMIN — PROPOFOL 30 MG: 10 INJECTION, EMULSION INTRAVENOUS at 09:57

## 2024-06-12 RX ADMIN — KETOROLAC TROMETHAMINE 30 MG: 30 INJECTION, SOLUTION INTRAMUSCULAR; INTRAVENOUS at 09:58

## 2024-06-12 RX ADMIN — SODIUM CHLORIDE, POTASSIUM CHLORIDE, SODIUM LACTATE AND CALCIUM CHLORIDE: 600; 310; 30; 20 INJECTION, SOLUTION INTRAVENOUS at 16:15

## 2024-06-12 RX ADMIN — CEFTRIAXONE SODIUM 2000 MG: 2 INJECTION, POWDER, FOR SOLUTION INTRAMUSCULAR; INTRAVENOUS at 12:15

## 2024-06-12 RX ADMIN — PROPOFOL 130 MG: 10 INJECTION, EMULSION INTRAVENOUS at 09:41

## 2024-06-12 RX ADMIN — SODIUM CHLORIDE, POTASSIUM CHLORIDE, SODIUM LACTATE AND CALCIUM CHLORIDE: 600; 310; 30; 20 INJECTION, SOLUTION INTRAVENOUS at 03:27

## 2024-06-12 RX ADMIN — TAMSULOSIN HYDROCHLORIDE 0.4 MG: 0.4 CAPSULE ORAL at 08:07

## 2024-06-12 RX ADMIN — ARIPIPRAZOLE 15 MG: 10 TABLET ORAL at 08:07

## 2024-06-12 RX ADMIN — KETOROLAC TROMETHAMINE 15 MG: 30 INJECTION INTRAMUSCULAR; INTRAVENOUS at 03:23

## 2024-06-12 RX ADMIN — DEXAMETHASONE SODIUM PHOSPHATE 8 MG: 4 INJECTION, SOLUTION INTRAMUSCULAR; INTRAVENOUS at 09:44

## 2024-06-12 RX ADMIN — OXYCODONE AND ACETAMINOPHEN 1 TABLET: 5; 325 TABLET ORAL at 21:44

## 2024-06-12 RX ADMIN — KETOROLAC TROMETHAMINE 15 MG: 30 INJECTION INTRAMUSCULAR; INTRAVENOUS at 16:08

## 2024-06-12 RX ADMIN — SODIUM CHLORIDE: 9 INJECTION, SOLUTION INTRAVENOUS at 12:15

## 2024-06-12 RX ADMIN — DULOXETINE HYDROCHLORIDE 90 MG: 30 CAPSULE, DELAYED RELEASE ORAL at 08:06

## 2024-06-12 RX ADMIN — HYDROMORPHONE HYDROCHLORIDE 1 MG: 1 INJECTION, SOLUTION INTRAMUSCULAR; INTRAVENOUS; SUBCUTANEOUS at 03:24

## 2024-06-12 RX ADMIN — SODIUM CHLORIDE, SODIUM LACTATE, POTASSIUM CHLORIDE, AND CALCIUM CHLORIDE: .6; .31; .03; .02 INJECTION, SOLUTION INTRAVENOUS at 09:37

## 2024-06-12 RX ADMIN — OXYCODONE AND ACETAMINOPHEN 1 TABLET: 5; 325 TABLET ORAL at 08:14

## 2024-06-12 ASSESSMENT — ENCOUNTER SYMPTOMS
COUGH: 0
SINUS PRESSURE: 0
CONSTIPATION: 0
VOMITING: 0
SORE THROAT: 0
SINUS PAIN: 0
DIARRHEA: 0
SHORTNESS OF BREATH: 0
ABDOMINAL PAIN: 0
NAUSEA: 0
WHEEZING: 0
COLOR CHANGE: 0
BACK PAIN: 0

## 2024-06-12 ASSESSMENT — PAIN DESCRIPTION - LOCATION
LOCATION: HEAD;BACK
LOCATION: FLANK
LOCATION: HEAD;BACK
LOCATION: BACK;FLANK;HEAD
LOCATION: FLANK
LOCATION: BACK;FLANK;HEAD
LOCATION: BACK

## 2024-06-12 ASSESSMENT — PAIN DESCRIPTION - ORIENTATION
ORIENTATION: LEFT
ORIENTATION: LEFT
ORIENTATION: LEFT;MID
ORIENTATION: LEFT
ORIENTATION: LEFT
ORIENTATION: LEFT;MID

## 2024-06-12 ASSESSMENT — PAIN DESCRIPTION - DESCRIPTORS
DESCRIPTORS: TENDER
DESCRIPTORS: ACHING
DESCRIPTORS: ACHING
DESCRIPTORS: TENDER
DESCRIPTORS: DISCOMFORT;ACHING

## 2024-06-12 ASSESSMENT — PAIN SCALES - GENERAL
PAINLEVEL_OUTOF10: 5
PAINLEVEL_OUTOF10: 6
PAINLEVEL_OUTOF10: 7
PAINLEVEL_OUTOF10: 5
PAINLEVEL_OUTOF10: 4
PAINLEVEL_OUTOF10: 5
PAINLEVEL_OUTOF10: 4
PAINLEVEL_OUTOF10: 0
PAINLEVEL_OUTOF10: 0
PAINLEVEL_OUTOF10: 7
PAINLEVEL_OUTOF10: 4

## 2024-06-12 ASSESSMENT — LIFESTYLE VARIABLES: SMOKING_STATUS: 1

## 2024-06-12 ASSESSMENT — PAIN - FUNCTIONAL ASSESSMENT
PAIN_FUNCTIONAL_ASSESSMENT: 0-10
PAIN_FUNCTIONAL_ASSESSMENT: ACTIVITIES ARE NOT PREVENTED

## 2024-06-12 NOTE — OP NOTE
Operative Note      Patient: Jesus Olivas  YOB: 1975  MRN: [id number]    Date of Procedure: 6/12/2024    Pre-Op Diagnosis Codes:     * Left ureteral calculus [N20.1]    Post-Op Diagnosis: Same       Procedure(s):  CYSTOSCOPY   LEFT STONE MANIPULATION  LEFT URETERAL STENT INSERTION    Surgeon(s):  Parker Lay MD    Assistant:   Surgical Assistant: Spencer Toledo    Anesthesia: General    Estimated Blood Loss (mL): Minimal    Complications: None    Specimens:   ID Type Source Tests Collected by Time Destination   1 : URINE FROM CYSTOSCOPE Urine Urine, Cystoscopic CULTURE, URINE Parker Lay MD 6/12/2024 1001        Implants:  Implant Name Type Inv. Item Serial No.  Lot No. LRB No. Used Action   STENT URET 6FR L26CM PERCFLX HYDR+ TAPR TIP GRAD - [id number]  STENT URET 6FR L26CM PERCFLX HYDR+ TAPR TIP GRAD  Caset[employer] [facility] [id number] Left 1 Implanted         Drains: * No LDAs found *    Findings:  Infection Present At Time Of Surgery (PATOS) (choose all levels that have infection present):  - Organ Space infection (below fascia) present as evidenced by fluid consistent with infection  Other Findings: left sided stent placed    Detailed Description of Procedure:   After obtaining informed consent, the patient was brought to the operating room and placed supine on the operating room table.  After adequate anesthesia, the patient was then repositioned in the dorsal lithotomy position and prepped & draped in the standard surgical fashion. I began the case by doing rigid cystoscopy with a 21-Azeri sheath and a 30-degree lens. Urethra was normal. The bladder was free of masses or lesions.       The LEFT UO was identified. It was cannulated with a sensor wire. I tried to pass a 5Fr open ended but it kept buckling as the stone was impacted. I had to manipulate this by the stone, which was quite impacted.  A [id number] JJ stent was placed - it had to be manipulated past the stone.  Urine

## 2024-06-12 NOTE — CONSULTS
Infectious Diseases   Consult Note      Reason for Consult:  obstructing stone w pyelo, bacteremia   Requesting Physician:  Henrry      Date of Admission: 6/11/2024    Subjective:   CHIEF COMPLAINT:  none given       HPI:    Jesus Olivas is a 48yoF with history of migraine, anxiety, depression.     Admitted 6/11/24 with flank pain, dysuria, onset was abrupt earlier that day.  No prior history of stones.  Imaging revealed L uretral stone causing obstruction and changes of pyelonephritis.    OR cysto, stent placement today     Blood cultures are positive with E coli. UC Is pending.  She is comfortable.   ID is consulted re bacteremia.      Current abx:  Rocephin 2g q24        Past Surgical History:       Diagnosis Date    Abnormal Pap smear of cervix     LEEP/ hormome supression/cone biposy    Anxiety     Cancer (HCC)     cervical    Depression     Migraine          Procedure Laterality Date    APPENDECTOMY  08/2012    BREAST LUMPECTOMY Left     BUNIONECTOMY Right 2020    CYST REMOVAL N/A 1994    l index    CYSTOSCOPY Left 6/12/2024    CYSTOSCOPY, LEFT URETERAL STENT INSERTION performed by Parker Lay MD at [facility] OR    HERNIA REPAIR  10/2012    KNEE ARTHROSCOPY Bilateral 91,92    LEEP      LEEP N/A 4/25/2024    LOOP ELECTROSURGICAL EXCISION PROCEDURE performed by Alka Smith DO at [facility] OR    CLAUDIO STEROTACTIC LOC BREAST BIOPSY RIGHT Right 01/22/2024    CLAUDIO STEROTACTIC LOC BREAST BIOPSY RIGHT 1/22/2024 [facility]    SMALL INTESTINE SURGERY  2015    partial volvulus-resected         Social History:    TOBACCO:   reports that she has been smoking cigarettes. She has a 10.0 pack-year smoking history. She has never used smokeless tobacco.  ETOH:   reports current alcohol use of about 3.0 standard drinks of alcohol per week.  There is no history of illicit drug use or other significant epidemiologic exposures.      Family History:       Problem Relation Age of Onset    Atrial 
Heart Disease Maternal Grandmother     Heart Disease Maternal Grandfather     Breast Cancer Maternal Great Grandmother        Social History     Tobacco Use    Smoking status: Every Day     Current packs/day: 0.50     Average packs/day: 0.5 packs/day for 20.0 years (10.0 ttl pk-yrs)     Types: Cigarettes    Smokeless tobacco: Never   Vaping Use    Vaping Use: Never used   Substance Use Topics    Alcohol use: Yes     Alcohol/week: 3.0 standard drinks of alcohol     Types: 3 Glasses of wine per week    Drug use: Not Currently     Types: Marijuana (Weed)         Review of Systems: A 12 point ROS was performed and was unremarkable unless listed in the history of present illness.      I/O last 3 completed shifts:  In: 1494.8 [P.O.:100; I.V.:1394.8]  Out: 1450 [Urine:1450]    Physical Exam:  Patient Vitals for the past 8 hrs:   BP Temp Temp src Pulse Resp SpO2   06/12/24 0800 98/63 -- -- 89 -- --   06/12/24 0745 104/71 98.5 °F (36.9 °C) Oral 81 18 99 %   06/12/24 0415 101/60 -- -- -- -- --   06/12/24 0411 101/60 -- -- -- -- --   06/12/24 0404 (!) 88/51 97.7 °F (36.5 °C) Oral 86 16 94 %   06/12/24 0117 (!) 92/58 98 °F (36.7 °C) Oral 95 16 95 %     Constitutional: pleasant patient in NAD, with a normal body habitus, in pain   EENT: pink conjunctivae, lips without cyanosis, normal appearance of ears and nose  Neck: no masses or lesions, trachea midline   Respiratory: normal respiratory movements without distress   Cardiovascular: regular rate and rhythm, lower extremities without edema   Abdomen: soft, NTND, no masses, no guarding  Back: left CVAT, no abnormal curvature of the spine  Lymphatic: no palpable cervical or supraclavicular lymphadenopathy  Skin: warm and dry, no rashes, lesions, or ulcers  Musculoskeletal: normal ROM, m. tone, no digital cyanosis, head normocephalic  Psych: normal mood and affect, alert and appropriately answers questions  : stable bladder, no urethral catheter    Labs:     No results found for:

## 2024-06-12 NOTE — ANESTHESIA POSTPROCEDURE EVALUATION
Department of Anesthesiology  Postprocedure Note    Patient: Jesus Olivas  MRN: [id number]  YOB: 1975  Date of evaluation: 6/12/2024    Procedure Summary       Date: 06/12/24 Room / Location: [address]    Anesthesia Start: 0937 Anesthesia Stop: 1009    Procedure: CYSTOSCOPY, LEFT URETERAL STENT INSERTION (Left: Bladder) Diagnosis:       Left ureteral calculus      (Left ureteral calculus [N20.1])    Surgeons: Parker Lay MD Responsible Provider: Placido Leiva MD    Anesthesia Type: general ASA Status: 2            Anesthesia Type: No value filed.    Mathew Phase I: Mathew Score: 7    Mathew Phase II:      Anesthesia Post Evaluation    Patient location during evaluation: PACU  Level of consciousness: awake  Airway patency: patent  Nausea & Vomiting: no vomiting  Cardiovascular status: blood pressure returned to baseline  Respiratory status: acceptable  Hydration status: stable  Pain management: adequate    No notable events documented.

## 2024-06-12 NOTE — ANESTHESIA PRE PROCEDURE
Alcohol use: Yes     Alcohol/week: 3.0 standard drinks of alcohol     Types: 3 Glasses of wine per week                                Ready to quit: Not Answered  Counseling given: Not Answered      Vital Signs (Current):   Vitals:    06/12/24 0415 06/12/24 0745 06/12/24 0800 06/12/24 0829   BP: 101/60 104/71 98/63 103/75   Pulse:  81 89 88   Resp:  18  16   Temp:  98.5 °F (36.9 °C)  98.5 °F (36.9 °C)   TempSrc:  Oral  Oral   SpO2:  99%  96%   Weight:       Height:                                                  BP Readings from Last 3 Encounters:   06/12/24 103/75   05/08/24 (!) 142/86   04/25/24 (!) 148/88       NPO Status: Time of last liquid consumption: 0000                        Time of last solid consumption: 0000                        Date of last liquid consumption: 06/12/24                        Date of last solid food consumption: 06/12/24    BMI:   Wt Readings from Last 3 Encounters:   06/11/24 65.8 kg (145 lb)   05/08/24 66 kg (145 lb 6.4 oz)   04/25/24 64.4 kg (142 lb)     Body mass index is 22.71 kg/m².    CBC:   Lab Results   Component Value Date/Time    WBC 20.2 06/12/2024 06:29 AM    RBC 4.09 06/12/2024 06:29 AM    HGB 13.2 06/12/2024 06:29 AM    HCT 39.3 06/12/2024 06:29 AM    MCV 96.0 06/12/2024 06:29 AM    RDW 14.4 06/12/2024 06:29 AM     06/12/2024 06:29 AM       CMP:   Lab Results   Component Value Date/Time     06/12/2024 06:29 AM    K 4.6 06/12/2024 06:29 AM     06/12/2024 06:29 AM    CO2 26 06/12/2024 06:29 AM    BUN 13 06/12/2024 06:29 AM    CREATININE 0.7 06/12/2024 06:29 AM    GFRAA >60 08/30/2022 09:35 AM    AGRATIO 1.5 06/11/2024 01:47 PM    LABGLOM >90 06/12/2024 06:29 AM    LABGLOM >60 09/15/2023 09:21 AM    GLUCOSE 94 06/12/2024 06:29 AM    CALCIUM 8.5 06/12/2024 06:29 AM    BILITOT 0.6 06/11/2024 01:47 PM    ALKPHOS 70 06/11/2024 01:47 PM    AST 17 06/11/2024 01:47 PM    ALT 12 06/11/2024 01:47 PM       POC Tests: No results for input(s): \"POCGLU\", \"POCNA\",

## 2024-06-13 VITALS
TEMPERATURE: 98 F | DIASTOLIC BLOOD PRESSURE: 84 MMHG | WEIGHT: 145 LBS | HEART RATE: 73 BPM | RESPIRATION RATE: 14 BRPM | HEIGHT: 67 IN | OXYGEN SATURATION: 98 % | BODY MASS INDEX: 22.76 KG/M2 | SYSTOLIC BLOOD PRESSURE: 128 MMHG

## 2024-06-13 LAB
ANION GAP SERPL CALCULATED.3IONS-SCNC: 8 MMOL/L (ref 3–16)
BACTERIA UR CULT: ABNORMAL
BASOPHILS # BLD: 0 K/UL (ref 0–0.2)
BASOPHILS NFR BLD: 0.2 %
BUN SERPL-MCNC: 18 MG/DL (ref 7–20)
CALCIUM SERPL-MCNC: 8.9 MG/DL (ref 8.3–10.6)
CHLORIDE SERPL-SCNC: 107 MMOL/L (ref 99–110)
CO2 SERPL-SCNC: 23 MMOL/L (ref 21–32)
CREAT SERPL-MCNC: 0.6 MG/DL (ref 0.6–1.1)
DEPRECATED RDW RBC AUTO: 14.4 % (ref 12.4–15.4)
EOSINOPHIL # BLD: 0 K/UL (ref 0–0.6)
EOSINOPHIL NFR BLD: 0.2 %
GFR SERPLBLD CREATININE-BSD FMLA CKD-EPI: >90 ML/MIN/{1.73_M2}
GLUCOSE SERPL-MCNC: 87 MG/DL (ref 70–99)
HCT VFR BLD AUTO: 36.4 % (ref 36–48)
HGB BLD-MCNC: 12.2 G/DL (ref 12–16)
LYMPHOCYTES # BLD: 1.5 K/UL (ref 1–5.1)
LYMPHOCYTES NFR BLD: 7.8 %
MCH RBC QN AUTO: 32 PG (ref 26–34)
MCHC RBC AUTO-ENTMCNC: 33.5 G/DL (ref 31–36)
MCV RBC AUTO: 95.7 FL (ref 80–100)
MONOCYTES # BLD: 1.4 K/UL (ref 0–1.3)
MONOCYTES NFR BLD: 7.2 %
NEUTROPHILS # BLD: 16.2 K/UL (ref 1.7–7.7)
NEUTROPHILS NFR BLD: 84.6 %
ORGANISM: ABNORMAL
PLATELET # BLD AUTO: 154 K/UL (ref 135–450)
PMV BLD AUTO: 10.4 FL (ref 5–10.5)
POTASSIUM SERPL-SCNC: 3.9 MMOL/L (ref 3.5–5.1)
RBC # BLD AUTO: 3.8 M/UL (ref 4–5.2)
SODIUM SERPL-SCNC: 138 MMOL/L (ref 136–145)
WBC # BLD AUTO: 19.2 K/UL (ref 4–11)

## 2024-06-13 PROCEDURE — 36415 COLL VENOUS BLD VENIPUNCTURE: CPT

## 2024-06-13 PROCEDURE — 85025 COMPLETE CBC W/AUTO DIFF WBC: CPT

## 2024-06-13 PROCEDURE — 80048 BASIC METABOLIC PNL TOTAL CA: CPT

## 2024-06-13 PROCEDURE — 2580000003 HC RX 258: Performed by: STUDENT IN AN ORGANIZED HEALTH CARE EDUCATION/TRAINING PROGRAM

## 2024-06-13 PROCEDURE — 6360000002 HC RX W HCPCS: Performed by: STUDENT IN AN ORGANIZED HEALTH CARE EDUCATION/TRAINING PROGRAM

## 2024-06-13 PROCEDURE — 6370000000 HC RX 637 (ALT 250 FOR IP): Performed by: STUDENT IN AN ORGANIZED HEALTH CARE EDUCATION/TRAINING PROGRAM

## 2024-06-13 RX ORDER — SULFAMETHOXAZOLE AND TRIMETHOPRIM 800; 160 MG/1; MG/1
1 TABLET ORAL 2 TIMES DAILY
Qty: 20 TABLET | Refills: 0 | Status: SHIPPED | OUTPATIENT
Start: 2024-06-13 | End: 2024-06-23

## 2024-06-13 RX ORDER — TAMSULOSIN HYDROCHLORIDE 0.4 MG/1
0.4 CAPSULE ORAL DAILY
Qty: 30 CAPSULE | Refills: 0 | Status: SHIPPED | OUTPATIENT
Start: 2024-06-13

## 2024-06-13 RX ADMIN — TAMSULOSIN HYDROCHLORIDE 0.4 MG: 0.4 CAPSULE ORAL at 08:32

## 2024-06-13 RX ADMIN — SODIUM CHLORIDE, POTASSIUM CHLORIDE, SODIUM LACTATE AND CALCIUM CHLORIDE: 600; 310; 30; 20 INJECTION, SOLUTION INTRAVENOUS at 00:51

## 2024-06-13 RX ADMIN — ENOXAPARIN SODIUM 40 MG: 100 INJECTION SUBCUTANEOUS at 08:36

## 2024-06-13 RX ADMIN — SODIUM CHLORIDE: 9 INJECTION, SOLUTION INTRAVENOUS at 11:47

## 2024-06-13 RX ADMIN — CEFTRIAXONE SODIUM 2000 MG: 2 INJECTION, POWDER, FOR SOLUTION INTRAMUSCULAR; INTRAVENOUS at 11:48

## 2024-06-13 RX ADMIN — DULOXETINE HYDROCHLORIDE 90 MG: 30 CAPSULE, DELAYED RELEASE ORAL at 08:32

## 2024-06-13 RX ADMIN — ARIPIPRAZOLE 15 MG: 10 TABLET ORAL at 08:32

## 2024-06-13 RX ADMIN — TOPIRAMATE 100 MG: 100 TABLET, FILM COATED ORAL at 08:32

## 2024-06-13 RX ADMIN — SODIUM CHLORIDE, POTASSIUM CHLORIDE, SODIUM LACTATE AND CALCIUM CHLORIDE: 600; 310; 30; 20 INJECTION, SOLUTION INTRAVENOUS at 08:34

## 2024-06-13 RX ADMIN — OXYCODONE AND ACETAMINOPHEN 1 TABLET: 5; 325 TABLET ORAL at 05:07

## 2024-06-13 ASSESSMENT — PAIN SCALES - GENERAL
PAINLEVEL_OUTOF10: 4
PAINLEVEL_OUTOF10: 6

## 2024-06-13 ASSESSMENT — PAIN DESCRIPTION - LOCATION
LOCATION: BACK;FLANK
LOCATION: FLANK

## 2024-06-13 ASSESSMENT — PAIN DESCRIPTION - ORIENTATION
ORIENTATION: LEFT
ORIENTATION: LEFT

## 2024-06-13 ASSESSMENT — PAIN DESCRIPTION - DESCRIPTORS: DESCRIPTORS: BURNING

## 2024-06-13 NOTE — CARE COORDINATION
CASE MANAGEMENT DISCHARGE SUMMARY      Discharge to: Home NN    Precertification completed: N/A  Hospital Exemption Notification (HENS) completed: N/A    IMM given: (date) N/A    New Durable Medical Equipment ordered/agency: N/A    Transportation:    Family/car: Personal      Confirmed discharge plan with:     Patient: yes     Family:  yes          RN, name: Marya    Note: Discharging nurse to complete MART, reconcile AVS, and place final copy with patient's discharge packet. RN to ensure that written prescriptions for  Level II medications are sent with patient to the facility as per protocol.      Magaly Chavez, RN    
discharge:      Financial    Payor: BCBS / Plan: BCBS - [place] PPO / Product Type: *No Product type* /     Does insurance require precert for SNF: Yes    Potential assistance Purchasing Medications: No  Meds-to-Beds request: Yes      [facility] [id number] [place], [place] - [address] - P [phone] - F [phone] [address] [address]  Phone: [phone] Fax: [phone]      Notes:    Factors facilitating achievement of predicted outcomes: Family support, Motivated, Cooperative, Pleasant, Sense of humor, and Good insight into deficits    Barriers to discharge: Limited family support and No caregiver support    Additional Case Management Notes: Patient lives alone in an apartment, she is IPTA, she is an active , and has a PCP. Patient declines any needs from DCP.     The Plan for Transition of Care is related to the following treatment goals of Ureteral stone [N20.1]    IF APPLICABLE: The Patient and/or patient representative Jesus and her family were provided with a choice of provider and agrees with the discharge plan. Freedom of choice list with basic dialogue that supports the patient's individualized plan of care/goals and shares the quality data associated with the providers was provided to: Patient   Patient Representative Name:       The Patient and/or Patient Representative Agree with the Discharge Plan? Yes    Magaly Chavez RN  Case Management Department  Ph: [phone] Fax: [phone]

## 2024-06-13 NOTE — DISCHARGE INSTRUCTIONS
drink.  Urinate often. Try to empty your bladder each time.  To relieve pain, take a hot shower or lay a heating pad (set on low) over your lower belly. Never go to sleep with a heating pad in place. Put a thin cloth between the heating pad and your skin.  To help prevent kidney infections  Drink plenty of water each day. This helps you urinate often, which clears bacteria from your system. If you have kidney, heart, or liver disease and have to limit fluids, talk with your doctor before you increase the amount of fluids you drink.  Urinate when you have the urge. Do not hold your urine for a long time. Urinate before you go to sleep.  If you have symptoms of a bladder infection, such as burning when you urinate or having to urinate often, call your doctor so you can treat the problem before it gets worse. If you do not treat a bladder infection quickly, it can spread to the kidney.  Men should keep the tip of the penis clean.  If you are a woman, keep these ideas in mind:  Urinate right after you have sex.  Change sanitary pads often. Avoid douches, feminine hygiene sprays, and other feminine hygiene products that have deodorants.  After going to the bathroom, wipe from front to back.  When should you call for help?   Call your doctor now or seek immediate medical care if:    You have symptoms that a kidney infection is getting worse. These may include:  Pain or burning when you urinate.  A frequent need to urinate without being able to pass much urine.  Pain in the flank, which is just below the rib cage and above the waist on either side of the back.  Blood in the urine.  A fever.     You are vomiting or nauseated.   Watch closely for changes in your health, and be sure to contact your doctor if:    You do not get better as expected.   Where can you learn more?  Go to https://www.healthwise.net/patientEd and enter E825 to learn more about \"Kidney Infection: Care Instructions.\"  Current as of: November 15,

## 2024-06-13 NOTE — PLAN OF CARE
Problem: Discharge Planning  Goal: Discharge to home or other facility with appropriate resources  6/13/2024 0332 by Will Rousseau RN  Outcome: Progressing  6/12/2024 1340 by Mary Watters RN  Outcome: Progressing     Problem: Pain  Goal: Verbalizes/displays adequate comfort level or baseline comfort level  6/13/2024 0332 by Will Rousseau RN  Outcome: Progressing  6/12/2024 1340 by Mary Watters RN  Outcome: Progressing     Problem: ABCDS Injury Assessment  Goal: Absence of physical injury  6/13/2024 0332 by Will Rousseau RN  Outcome: Progressing  6/12/2024 1340 by Mary Watters RN  Outcome: Progressing

## 2024-06-13 NOTE — PROGRESS NOTES
V2.0  [facility] Hospitalist Progress Note      Name:  Jesus Olivas /Age/Sex: 1975  (48 y.o. female)   MRN & CSN:  [id number] & [id number] Encounter Date/Time: 2024 12:57 PM EDT    Location:  [address] [place] [address] PCP: Jenny Castro PA       Hospital Day: 2    Assessment and Plan:   Jesus Olivas is a 48 y.o. female who presents with Ureteral stone      Plan:    Sepsis secondary to pyelonephritis with E. coli bacteremia  CT findings consistent in setting of stone.  IV antibiotics: Rocephin 2 g daily  Follow-up urinary culture result  Follow-up blood culture result: Growing E. Coli  Infectious disease consulted     Ureteral stone  Urology consulted  Stent placement on 2024  IV hydration  Flomax  Pain control with Toradol and Dilaudid    Migrains  Anxiety and depression  Continue home medications at this time    Diet Diet NPO Exceptions are: Sips of Water with Meds   DVT Prophylaxis [x] Lovenox, []  Heparin, [] SCDs, [] Ambulation,    [] Eliquis, [] Xarelto  [] Coumadin [] other   Code Status Full Code   Disposition From: Home  Expected Disposition: Home  Estimated Date of Discharge: 3 days  Patient requires continued admission due to bacteremia   Surrogate Decision Maker/ POA      Personally reviewed Lab Studies and Imaging     Discussed management of the case with urology who recommended stent placement     EKG interpreted personally and results no acute findings     Imaging that was interpreted personally includes CT abdomen and results consistent with stone and pyelonephritis    Subjective:     Chief Complaint: Abdominal Pain (Woke up at 1am with sharp, left sided abd pain that radiate to her back. Reports having pain with urination. )     Patient with stent placed today with urology. Pain vastly improved after the procedure. Her blood cultures are positive for E. Coli.     Review of Systems:    Review of Systems   Constitutional:  Positive for fatigue and fever. Negative for activity change, appetite 
Patient admitted to [facility] 4 in preparation for surgery, VSS. Consent confirmed. IV in place in LAC, LR infusing. Belongings in inpatient room. NPO since midnight. Patient does not have any family or anyone she wants us to update post procedure, call light within reach.     
Patient arrived to PACU bay 3, phase one initiated. Placed on bedside monitor, VSS. Report obtained from OR RN and anesthesia. Patient on O2 via room air at 0L. Assessment WNL. Warm blankets applied. Side rails in place, will monitor patient closely.    
Pt given d/c instructions and verbalizes understanding.  
Pt given jewelry and prescriptions.     Pt given d/c instructions and verbalizes understanding.     Escorted to vehicle to wheelchair.   
Pt removed valuables including bracelets necklaces, Earrings ,Anklet, and placed in denture cup. Labeled denture cup and placed in lock box, before pt left the floor.   
ureteral stent insertion with Dr. Lay 6/12/24  PLAN   Cystoscopy with left ureteroscopy and ureteral stent removal in 2-3 weeks. Our office will contact her to arrange this  Continue culture specific antibiotics      Luana Rascon, APRN - CNP 6/13/2024 12:20 PM

## 2024-06-13 NOTE — ACP (ADVANCE CARE PLANNING)
I attempted to follow up with Jesus for assistance with Advanced Directives while admitted, she was discharged before I could make a second attempt to meet with her.  Per her chart, her parent, Gertrudis Christy is listed as her primary emergency contact and \"HIPAA.\"     For assistance, chaplains are available as ACP specialists to provide support for completing HCPOA forms and Living Will if patient wishes to complete.    Thank you for consulting Spiritual Health    If you would like a 's presence for emotional, spiritual, grief or comfort care,   please dial \"[phone]\" and ask for the  on-call to be paged.    For help with Advanced Care Planning,   Power of  for Healthcare or Living Will forms,   you may also call us directly:    [phone]) William  [phone]) [place]  [phone]) Outpatient    [facility]

## 2024-06-13 NOTE — DISCHARGE INSTR - DIET

## 2024-06-13 NOTE — DISCHARGE SUMMARY
V2.0  Discharge Summary    Name:  Jesus Olivas /Age/Sex: 1975 (48 y.o. female)   Admit Date: 2024  Discharge Date: 24    MRN & CSN:  [id number] & [id number] Encounter Date and Time 24 12:03 PM EDT    Attending:  Joyce Polk MD Discharging Provider: Joyce Polk MD       Hospital Course:     Brief HPI: Jesus Olivas is a 48 y.o. female who presented with kidney stone    Brief Problem Based Course:   Sepsis secondary to pyelonephritis with E. coli bacteremia and nephrolithiasis.  Started on Rocephin 2 g daily.  Urine culture and blood culture positive for E. coli.  Sensitive to Bactrim ultimately I would discharge to be changed to oral Bactrim for 10 more days to complete the course.  Repeat cultures are negative infectious disease follow-up recommended outpatient along with urology follow-up.  S/p stent placement on 2024.  To be discharged with Flomax and antibiotic   History of migraines anxiety and depression resume home medications       Medical Decision Making:  The following items were considered in medical decision making:  Discussion of patient care with other providers  Reviewed clinical lab tests if any  Reviewed radiology tests if any  Reviewed other diagnostic tests/interventions  Independent review of radiologic images if any  Microbiology cultures and other micro tests if any    The patient expressed appropriate understanding of, and agreement with the discharge recommendations, medications, and plan.     Consults this admission:  IP CONSULT TO UROLOGY  IP CONSULT TO SPIRITUAL SERVICES  IP CONSULT TO INFECTIOUS DISEASES    Discharge Diagnosis:   Ureteral stone      Discharge Instruction:   Follow up appointments: PCP, urology, infectious disease  Primary care physician: Jenny Castro PA within 2 weeks  Diet: cardiac diet   Activity: activity as tolerated  Disposition: Discharged to:   [x]Home, []C, []SNF, []Acute Rehab, []Hospice   Condition on discharge:

## 2024-06-14 ENCOUNTER — TELEPHONE (OUTPATIENT)
Dept: FAMILY MEDICINE CLINIC | Age: 49
End: 2024-06-14

## 2024-06-14 LAB
BACTERIA BLD CULT ORG #2: ABNORMAL
BACTERIA BLD CULT: ABNORMAL
BACTERIA BLD CULT: ABNORMAL
BACTERIA UR CULT: ABNORMAL
ORGANISM: ABNORMAL

## 2024-06-14 NOTE — TELEPHONE ENCOUNTER
Left message for patient to call back, please schedule for a hospital follow up and document phone encounter using TCM dot phrase.

## 2024-06-17 NOTE — TELEPHONE ENCOUNTER
Care Transitions Initial Follow Up Call    Outreach made within 2 business days of discharge: Yes    Patient: Jesus Olivas Patient : 1975   MRN: [id number]  Reason for Admission: There are no discharge diagnoses documented for the most recent discharge.  Discharge Date: 24       Spoke with: Jesus     Discharge department/facility: [facility] Interactive Patient Contact:  Was patient able to fill all prescriptions: Yes  Was patient instructed to bring all medications to the follow-up visit: Yes  Is patient taking all medications as directed in the discharge summary? Yes  Does patient understand their discharge instructions: Yes  Does patient have questions or concerns that need addressed prior to 7-14 day follow up office visit: no    Scheduled appointment with PCP within 7-14 days    Follow Up  Future Appointments   Date Time Provider Department Center   2024  4:15 PM Jenny Castro PA [facility] [place] [facility]   2024  2:00 PM [facility]   2024  2:30 PM [facility] US [facility]   2024  3:00 PM [facility] MRI [facility]       Maikol Farrar MA

## 2024-06-19 ENCOUNTER — ANESTHESIA EVENT (OUTPATIENT)
Dept: OPERATING ROOM | Age: 49
End: 2024-06-19
Payer: COMMERCIAL

## 2024-06-24 ENCOUNTER — ANESTHESIA (OUTPATIENT)
Dept: OPERATING ROOM | Age: 49
End: 2024-06-24
Payer: COMMERCIAL

## 2024-07-19 ENCOUNTER — OFFICE VISIT (OUTPATIENT)
Dept: FAMILY MEDICINE CLINIC | Age: 49
End: 2024-07-19
Payer: COMMERCIAL

## 2024-07-19 VITALS
BODY MASS INDEX: 23.07 KG/M2 | RESPIRATION RATE: 16 BRPM | OXYGEN SATURATION: 99 % | HEIGHT: 67 IN | HEART RATE: 90 BPM | SYSTOLIC BLOOD PRESSURE: 130 MMHG | WEIGHT: 147 LBS | DIASTOLIC BLOOD PRESSURE: 92 MMHG

## 2024-07-19 DIAGNOSIS — N12 PYELONEPHRITIS: ICD-10-CM

## 2024-07-19 DIAGNOSIS — G47.00 INSOMNIA, UNSPECIFIED TYPE: ICD-10-CM

## 2024-07-19 DIAGNOSIS — N20.1 CALCULUS OF URETER: Primary | ICD-10-CM

## 2024-07-19 PROCEDURE — 99214 OFFICE O/P EST MOD 30 MIN: CPT | Performed by: PHYSICIAN ASSISTANT

## 2024-07-19 RX ORDER — TRAZODONE HYDROCHLORIDE 50 MG/1
50 TABLET ORAL NIGHTLY PRN
Qty: 30 TABLET | Refills: 0 | Status: SHIPPED | OUTPATIENT
Start: 2024-07-19

## 2024-07-19 ASSESSMENT — PATIENT HEALTH QUESTIONNAIRE - PHQ9
7. TROUBLE CONCENTRATING ON THINGS, SUCH AS READING THE NEWSPAPER OR WATCHING TELEVISION: NOT AT ALL
1. LITTLE INTEREST OR PLEASURE IN DOING THINGS: NOT AT ALL
3. TROUBLE FALLING OR STAYING ASLEEP: NEARLY EVERY DAY
9. THOUGHTS THAT YOU WOULD BE BETTER OFF DEAD, OR OF HURTING YOURSELF: NOT AT ALL
SUM OF ALL RESPONSES TO PHQ9 QUESTIONS 1 & 2: 0
2. FEELING DOWN, DEPRESSED OR HOPELESS: NOT AT ALL
8. MOVING OR SPEAKING SO SLOWLY THAT OTHER PEOPLE COULD HAVE NOTICED. OR THE OPPOSITE, BEING SO FIGETY OR RESTLESS THAT YOU HAVE BEEN MOVING AROUND A LOT MORE THAN USUAL: NOT AT ALL
5. POOR APPETITE OR OVEREATING: NOT AT ALL
SUM OF ALL RESPONSES TO PHQ QUESTIONS 1-9: 6
SUM OF ALL RESPONSES TO PHQ QUESTIONS 1-9: 6
10. IF YOU CHECKED OFF ANY PROBLEMS, HOW DIFFICULT HAVE THESE PROBLEMS MADE IT FOR YOU TO DO YOUR WORK, TAKE CARE OF THINGS AT HOME, OR GET ALONG WITH OTHER PEOPLE: NOT DIFFICULT AT ALL
4. FEELING TIRED OR HAVING LITTLE ENERGY: NEARLY EVERY DAY
6. FEELING BAD ABOUT YOURSELF - OR THAT YOU ARE A FAILURE OR HAVE LET YOURSELF OR YOUR FAMILY DOWN: NOT AT ALL
SUM OF ALL RESPONSES TO PHQ QUESTIONS 1-9: 6
SUM OF ALL RESPONSES TO PHQ QUESTIONS 1-9: 6

## 2024-07-19 ASSESSMENT — ENCOUNTER SYMPTOMS: RESPIRATORY NEGATIVE: 1

## 2024-07-19 NOTE — PROGRESS NOTES
Subjective   Patient ID: Jesus Olivas is a 48 y.o. female.    HPI    Patient presents for hospital follow up. Was admitted 6/11-6/11. Had stent placed for renal stone. She is still having left flank pain, dysuria and blood in urine. Has had periods of incontinence. She has tried calling urology group multiple times and has not had any luck getting them to respond.   In reviewing chart looks like she is scheduled for cysto on 7/31 which she was unaware of , she thought she was just having an office visit. Call with urology group and they tell a different tale, that she has been called back, has been told about surgery and told will need pre-op for procedure.     She is having a hard time sleeping, has been on elavil for 10 years, it does not keep her asleep. Takes benadryl at night to help.       Review of Systems   Constitutional: Negative.    Respiratory: Negative.     Genitourinary:  Positive for dysuria, frequency and hematuria.   Psychiatric/Behavioral:  Positive for sleep disturbance.           Objective   Physical Exam  Constitutional:       Appearance: Normal appearance.   Cardiovascular:      Rate and Rhythm: Normal rate and regular rhythm.      Heart sounds: Normal heart sounds.   Abdominal:      Tenderness: There is abdominal tenderness. There is left CVA tenderness.   Skin:     General: Skin is warm and dry.   Neurological:      Mental Status: She is alert.            Assessment /PLan:     Diagnosis Orders   1. Calculus of ureter  External Referral To Urology- will need pre-op scheduled next week      2. Pyelonephritis  External Referral To Urology      3. Insomnia, unspecified type  D/c elavil and trial of trazodone.               EASTON Duron

## 2024-07-23 ENCOUNTER — TELEPHONE (OUTPATIENT)
Dept: FAMILY MEDICINE CLINIC | Age: 49
End: 2024-07-23

## 2024-07-23 NOTE — TELEPHONE ENCOUNTER
Talked with Kerry from [facility] who will send message to Luana from surgery scheduling group so pre-op instructions for pt's surgery on 07/31/24 can be faxed. Kerry was provided office fax and direct number so the office can be contacted.

## 2024-07-23 NOTE — PROGRESS NOTES
Pt stated she was coming to surgery by transport with insurance company. Instructed pt that someone had to be here with her the day of surgery and to stay with her after surgery. Pt verbalized understanding and stated she would have a friend with her the day of surgery.

## 2024-07-23 NOTE — PROGRESS NOTES

## 2024-07-24 ENCOUNTER — OFFICE VISIT (OUTPATIENT)
Dept: FAMILY MEDICINE CLINIC | Age: 49
End: 2024-07-24
Payer: COMMERCIAL

## 2024-07-24 VITALS
WEIGHT: 148.4 LBS | HEIGHT: 67 IN | SYSTOLIC BLOOD PRESSURE: 132 MMHG | OXYGEN SATURATION: 96 % | BODY MASS INDEX: 23.29 KG/M2 | DIASTOLIC BLOOD PRESSURE: 78 MMHG | TEMPERATURE: 98.2 F | RESPIRATION RATE: 16 BRPM | HEART RATE: 85 BPM

## 2024-07-24 DIAGNOSIS — Z72.0 TOBACCO USE: ICD-10-CM

## 2024-07-24 DIAGNOSIS — G43.809 OTHER MIGRAINE WITHOUT STATUS MIGRAINOSUS, NOT INTRACTABLE: ICD-10-CM

## 2024-07-24 DIAGNOSIS — R87.613 HIGH GRADE SQUAMOUS INTRAEPITHELIAL CERVICAL DYSPLASIA: ICD-10-CM

## 2024-07-24 DIAGNOSIS — Z01.818 PREOP EXAMINATION: Primary | ICD-10-CM

## 2024-07-24 DIAGNOSIS — N20.1 URETERAL STONE: ICD-10-CM

## 2024-07-24 PROBLEM — R92.2 INCONCLUSIVE MAMMOGRAM: Status: ACTIVE | Noted: 2024-07-24

## 2024-07-24 PROCEDURE — 99214 OFFICE O/P EST MOD 30 MIN: CPT | Performed by: PHYSICIAN ASSISTANT

## 2024-07-24 PROCEDURE — G2211 COMPLEX E/M VISIT ADD ON: HCPCS | Performed by: PHYSICIAN ASSISTANT

## 2024-07-24 NOTE — PROGRESS NOTES
satisfactory for surgery.      Jesus Olivas was advised to STOP all  NSAID medications including, but not limited to, aspirin, ibuprofen, and naprosyn 7 days prior to procedure. Suggested smoke cessation.    Continue all other meds     Electronically signed by EASTON Zurita on 7/24/2024 at 1:47 PM

## 2024-07-31 ENCOUNTER — APPOINTMENT (OUTPATIENT)
Dept: GENERAL RADIOLOGY | Age: 49
End: 2024-07-31
Attending: UROLOGY
Payer: COMMERCIAL

## 2024-07-31 ENCOUNTER — HOSPITAL ENCOUNTER (OUTPATIENT)
Age: 49
Setting detail: OUTPATIENT SURGERY
Discharge: HOME | End: 2024-07-31
Attending: UROLOGY | Admitting: UROLOGY
Payer: COMMERCIAL

## 2024-07-31 VITALS
BODY MASS INDEX: 23.07 KG/M2 | TEMPERATURE: 97.5 F | RESPIRATION RATE: 13 BRPM | OXYGEN SATURATION: 98 % | HEART RATE: 71 BPM | SYSTOLIC BLOOD PRESSURE: 130 MMHG | DIASTOLIC BLOOD PRESSURE: 93 MMHG | HEIGHT: 67 IN | WEIGHT: 147 LBS

## 2024-07-31 DIAGNOSIS — N20.1 CALCULUS OF URETER: ICD-10-CM

## 2024-07-31 LAB
ANION GAP SERPL CALCULATED.3IONS-SCNC: 11 MMOL/L (ref 3–16)
BUN SERPL-MCNC: 15 MG/DL (ref 7–20)
CALCIUM SERPL-MCNC: 9.7 MG/DL (ref 8.3–10.6)
CHLORIDE SERPL-SCNC: 104 MMOL/L (ref 99–110)
CO2 SERPL-SCNC: 25 MMOL/L (ref 21–32)
CREAT SERPL-MCNC: 0.6 MG/DL (ref 0.6–1.1)
GFR SERPLBLD CREATININE-BSD FMLA CKD-EPI: >90 ML/MIN/{1.73_M2}
GLUCOSE SERPL-MCNC: 69 MG/DL (ref 70–99)
HCG UR QL: NEGATIVE
POTASSIUM SERPL-SCNC: 4.5 MMOL/L (ref 3.5–5.1)
SODIUM SERPL-SCNC: 140 MMOL/L (ref 136–145)

## 2024-07-31 PROCEDURE — 88300 SURGICAL PATH GROSS: CPT

## 2024-07-31 PROCEDURE — 84703 CHORIONIC GONADOTROPIN ASSAY: CPT

## 2024-07-31 PROCEDURE — C1769 GUIDE WIRE: HCPCS | Performed by: UROLOGY

## 2024-07-31 PROCEDURE — 7100000000 HC PACU RECOVERY - FIRST 15 MIN: Performed by: UROLOGY

## 2024-07-31 PROCEDURE — 2720000010 HC SURG SUPPLY STERILE: Performed by: UROLOGY

## 2024-07-31 PROCEDURE — 82365 CALCULUS SPECTROSCOPY: CPT

## 2024-07-31 PROCEDURE — 7100000010 HC PHASE II RECOVERY - FIRST 15 MIN: Performed by: UROLOGY

## 2024-07-31 PROCEDURE — 2580000003 HC RX 258: Performed by: ANESTHESIOLOGY

## 2024-07-31 PROCEDURE — 7100000011 HC PHASE II RECOVERY - ADDTL 15 MIN: Performed by: UROLOGY

## 2024-07-31 PROCEDURE — 36415 COLL VENOUS BLD VENIPUNCTURE: CPT

## 2024-07-31 PROCEDURE — 3600000014 HC SURGERY LEVEL 4 ADDTL 15MIN: Performed by: UROLOGY

## 2024-07-31 PROCEDURE — 7100000001 HC PACU RECOVERY - ADDTL 15 MIN: Performed by: UROLOGY

## 2024-07-31 PROCEDURE — 6360000002 HC RX W HCPCS

## 2024-07-31 PROCEDURE — 2580000003 HC RX 258: Performed by: UROLOGY

## 2024-07-31 PROCEDURE — 3600000004 HC SURGERY LEVEL 4 BASE: Performed by: UROLOGY

## 2024-07-31 PROCEDURE — 3700000000 HC ANESTHESIA ATTENDED CARE: Performed by: UROLOGY

## 2024-07-31 PROCEDURE — 2709999900 HC NON-CHARGEABLE SUPPLY: Performed by: UROLOGY

## 2024-07-31 PROCEDURE — 2500000003 HC RX 250 WO HCPCS: Performed by: ANESTHESIOLOGY

## 2024-07-31 PROCEDURE — 6360000002 HC RX W HCPCS: Performed by: UROLOGY

## 2024-07-31 PROCEDURE — 80048 BASIC METABOLIC PNL TOTAL CA: CPT

## 2024-07-31 PROCEDURE — 76000 FLUOROSCOPY <1 HR PHYS/QHP: CPT

## 2024-07-31 PROCEDURE — 3700000001 HC ADD 15 MINUTES (ANESTHESIA): Performed by: UROLOGY

## 2024-07-31 RX ORDER — OXYCODONE HYDROCHLORIDE 5 MG/1
10 TABLET ORAL PRN
Status: CANCELLED | OUTPATIENT
Start: 2024-07-31 | End: 2024-07-31

## 2024-07-31 RX ORDER — SODIUM CHLORIDE 0.9 % (FLUSH) 0.9 %
5-40 SYRINGE (ML) INJECTION EVERY 12 HOURS SCHEDULED
Status: CANCELLED | OUTPATIENT
Start: 2024-07-31

## 2024-07-31 RX ORDER — SODIUM CHLORIDE 0.9 % (FLUSH) 0.9 %
5-40 SYRINGE (ML) INJECTION EVERY 12 HOURS SCHEDULED
Status: DISCONTINUED | OUTPATIENT
Start: 2024-07-31 | End: 2024-07-31 | Stop reason: SDUPTHER

## 2024-07-31 RX ORDER — SULFAMETHOXAZOLE AND TRIMETHOPRIM 400; 80 MG/1; MG/1
1 TABLET ORAL 2 TIMES DAILY
Qty: 8 TABLET | Refills: 0 | Status: SHIPPED | OUTPATIENT
Start: 2024-07-31 | End: 2024-08-04

## 2024-07-31 RX ORDER — SODIUM CHLORIDE 0.9 % (FLUSH) 0.9 %
5-40 SYRINGE (ML) INJECTION PRN
Status: DISCONTINUED | OUTPATIENT
Start: 2024-07-31 | End: 2024-07-31 | Stop reason: HOSPADM

## 2024-07-31 RX ORDER — DEXAMETHASONE SODIUM PHOSPHATE 4 MG/ML
INJECTION, SOLUTION INTRA-ARTICULAR; INTRALESIONAL; INTRAMUSCULAR; INTRAVENOUS; SOFT TISSUE PRN
Status: DISCONTINUED | OUTPATIENT
Start: 2024-07-31 | End: 2024-07-31 | Stop reason: SDUPTHER

## 2024-07-31 RX ORDER — PHENAZOPYRIDINE HYDROCHLORIDE 200 MG/1
200 TABLET, FILM COATED ORAL 3 TIMES DAILY PRN
Qty: 15 TABLET | Refills: 0 | Status: SHIPPED | OUTPATIENT
Start: 2024-07-31 | End: 2024-08-05

## 2024-07-31 RX ORDER — ONDANSETRON 2 MG/ML
INJECTION INTRAMUSCULAR; INTRAVENOUS PRN
Status: DISCONTINUED | OUTPATIENT
Start: 2024-07-31 | End: 2024-07-31 | Stop reason: SDUPTHER

## 2024-07-31 RX ORDER — SODIUM CHLORIDE 0.9 % (FLUSH) 0.9 %
5-40 SYRINGE (ML) INJECTION EVERY 12 HOURS SCHEDULED
Status: DISCONTINUED | OUTPATIENT
Start: 2024-07-31 | End: 2024-07-31 | Stop reason: HOSPADM

## 2024-07-31 RX ORDER — MIDAZOLAM HYDROCHLORIDE 1 MG/ML
INJECTION INTRAMUSCULAR; INTRAVENOUS PRN
Status: DISCONTINUED | OUTPATIENT
Start: 2024-07-31 | End: 2024-07-31 | Stop reason: SDUPTHER

## 2024-07-31 RX ORDER — OXYCODONE HYDROCHLORIDE AND ACETAMINOPHEN 5; 325 MG/1; MG/1
0.5 TABLET ORAL EVERY 4 HOURS PRN
Qty: 10 TABLET | Refills: 0 | Status: SHIPPED | OUTPATIENT
Start: 2024-07-31 | End: 2024-08-05

## 2024-07-31 RX ORDER — SODIUM CHLORIDE, SODIUM LACTATE, POTASSIUM CHLORIDE, CALCIUM CHLORIDE 600; 310; 30; 20 MG/100ML; MG/100ML; MG/100ML; MG/100ML
INJECTION, SOLUTION INTRAVENOUS CONTINUOUS
Status: DISCONTINUED | OUTPATIENT
Start: 2024-07-31 | End: 2024-07-31 | Stop reason: SDUPTHER

## 2024-07-31 RX ORDER — SODIUM CHLORIDE 0.9 % (FLUSH) 0.9 %
5-40 SYRINGE (ML) INJECTION PRN
Status: DISCONTINUED | OUTPATIENT
Start: 2024-07-31 | End: 2024-07-31 | Stop reason: SDUPTHER

## 2024-07-31 RX ORDER — SODIUM CHLORIDE, SODIUM LACTATE, POTASSIUM CHLORIDE, CALCIUM CHLORIDE 600; 310; 30; 20 MG/100ML; MG/100ML; MG/100ML; MG/100ML
INJECTION, SOLUTION INTRAVENOUS CONTINUOUS
Status: DISCONTINUED | OUTPATIENT
Start: 2024-07-31 | End: 2024-07-31 | Stop reason: HOSPADM

## 2024-07-31 RX ORDER — NALOXONE HYDROCHLORIDE 0.4 MG/ML
INJECTION, SOLUTION INTRAMUSCULAR; INTRAVENOUS; SUBCUTANEOUS PRN
Status: CANCELLED | OUTPATIENT
Start: 2024-07-31

## 2024-07-31 RX ORDER — ONDANSETRON 2 MG/ML
4 INJECTION INTRAMUSCULAR; INTRAVENOUS
Status: CANCELLED | OUTPATIENT
Start: 2024-07-31 | End: 2024-08-01

## 2024-07-31 RX ORDER — PROPOFOL 10 MG/ML
INJECTION, EMULSION INTRAVENOUS PRN
Status: DISCONTINUED | OUTPATIENT
Start: 2024-07-31 | End: 2024-07-31 | Stop reason: SDUPTHER

## 2024-07-31 RX ORDER — SODIUM CHLORIDE 9 MG/ML
INJECTION, SOLUTION INTRAVENOUS PRN
Status: CANCELLED | OUTPATIENT
Start: 2024-07-31

## 2024-07-31 RX ORDER — SODIUM CHLORIDE 9 MG/ML
INJECTION, SOLUTION INTRAVENOUS PRN
Status: DISCONTINUED | OUTPATIENT
Start: 2024-07-31 | End: 2024-07-31 | Stop reason: HOSPADM

## 2024-07-31 RX ORDER — LIDOCAINE HYDROCHLORIDE 20 MG/ML
INJECTION, SOLUTION INTRAVENOUS PRN
Status: DISCONTINUED | OUTPATIENT
Start: 2024-07-31 | End: 2024-07-31 | Stop reason: SDUPTHER

## 2024-07-31 RX ORDER — SODIUM CHLORIDE 0.9 % (FLUSH) 0.9 %
5-40 SYRINGE (ML) INJECTION PRN
Status: CANCELLED | OUTPATIENT
Start: 2024-07-31

## 2024-07-31 RX ORDER — OXYCODONE HYDROCHLORIDE 5 MG/1
5 TABLET ORAL PRN
Status: CANCELLED | OUTPATIENT
Start: 2024-07-31 | End: 2024-07-31

## 2024-07-31 RX ORDER — SODIUM CHLORIDE 9 MG/ML
INJECTION, SOLUTION INTRAVENOUS PRN
Status: DISCONTINUED | OUTPATIENT
Start: 2024-07-31 | End: 2024-07-31 | Stop reason: SDUPTHER

## 2024-07-31 RX ORDER — FENTANYL CITRATE 50 UG/ML
INJECTION, SOLUTION INTRAMUSCULAR; INTRAVENOUS PRN
Status: DISCONTINUED | OUTPATIENT
Start: 2024-07-31 | End: 2024-07-31 | Stop reason: SDUPTHER

## 2024-07-31 RX ORDER — MEPERIDINE HYDROCHLORIDE 25 MG/ML
12.5 INJECTION INTRAMUSCULAR; INTRAVENOUS; SUBCUTANEOUS EVERY 5 MIN PRN
Status: CANCELLED | OUTPATIENT
Start: 2024-07-31

## 2024-07-31 RX ADMIN — MIDAZOLAM 2 MG: 1 INJECTION INTRAMUSCULAR; INTRAVENOUS at 12:45

## 2024-07-31 RX ADMIN — FENTANYL CITRATE 50 MCG: 50 INJECTION INTRAMUSCULAR; INTRAVENOUS at 12:45

## 2024-07-31 RX ADMIN — FENTANYL CITRATE 25 MCG: 50 INJECTION INTRAMUSCULAR; INTRAVENOUS at 13:16

## 2024-07-31 RX ADMIN — LIDOCAINE HYDROCHLORIDE 100 MG: 20 INJECTION, SOLUTION INTRAVENOUS at 12:52

## 2024-07-31 RX ADMIN — Medication 20 MG: at 10:57

## 2024-07-31 RX ADMIN — PROPOFOL 150 MG: 10 INJECTION, EMULSION INTRAVENOUS at 12:52

## 2024-07-31 RX ADMIN — ONDANSETRON 4 MG: 2 INJECTION INTRAMUSCULAR; INTRAVENOUS at 12:56

## 2024-07-31 RX ADMIN — SODIUM CHLORIDE, POTASSIUM CHLORIDE, SODIUM LACTATE AND CALCIUM CHLORIDE: 600; 310; 30; 20 INJECTION, SOLUTION INTRAVENOUS at 10:57

## 2024-07-31 RX ADMIN — SODIUM CHLORIDE 2000 MG: 900 INJECTION INTRAVENOUS at 12:57

## 2024-07-31 RX ADMIN — FENTANYL CITRATE 25 MCG: 50 INJECTION INTRAMUSCULAR; INTRAVENOUS at 13:06

## 2024-07-31 RX ADMIN — DEXAMETHASONE SODIUM PHOSPHATE 4 MG: 4 INJECTION, SOLUTION INTRAMUSCULAR; INTRAVENOUS at 12:55

## 2024-07-31 ASSESSMENT — PAIN DESCRIPTION - DESCRIPTORS: DESCRIPTORS: ACHING;DISCOMFORT

## 2024-07-31 ASSESSMENT — LIFESTYLE VARIABLES: SMOKING_STATUS: 1

## 2024-07-31 ASSESSMENT — ENCOUNTER SYMPTOMS: SHORTNESS OF BREATH: 0

## 2024-07-31 ASSESSMENT — PAIN - FUNCTIONAL ASSESSMENT: PAIN_FUNCTIONAL_ASSESSMENT: 0-10

## 2024-07-31 NOTE — PROGRESS NOTES
Patient discharged via wheelchair in stable condition with all belongings to private car.Mary Davis RN

## 2024-07-31 NOTE — PROGRESS NOTES
Patient admitted from OR to PACU.  Bedside report received.  Patient immediately hooked up to heart monitor.Mary Davis RN

## 2024-07-31 NOTE — PLAN OF CARE
and Phase II process.  23.  Patient pain level is established preoperatively using age appropriate pain scale.  24.  The patient will move to fall risk upon sedation- during and through the recovery phase.  Interventions- orient the patient to the environment, especially the location of the bathroom; provide treaded socks/non-skid footwear; demonstrate and teach back use of the nurse's call system; instruct the patient to call for help before getting out of bed; lock all movable equipment before transferring patient; keep bed in lowest position possible. 25.  Other:

## 2024-07-31 NOTE — ANESTHESIA POSTPROCEDURE EVALUATION
Department of Anesthesiology  Postprocedure Note    Patient: Jesus Olivas  MRN: [id number]  YOB: 1975  Date of evaluation: 7/31/2024    Procedure Summary       Date: 07/31/24 Room / Location: [address]    Anesthesia Start: 1245 Anesthesia Stop: 1328    Procedure: CYSTOSCOPY, LEFT URETEROSCOPY, STONE MANIPULATION, LEFT STENT REMOVAL, HOLMIUM LASER (Left: Bladder) Diagnosis:       Calculus of ureter      (Calculus of ureter [N20.1])    Surgeons: Celestine Ding MD Responsible Provider: Alex Waldron MD    Anesthesia Type: general ASA Status: 2            Anesthesia Type: No value filed.    Mathew Phase I: Mathew Score: 10    Mathew Phase II: Mathew Score: 10    Anesthesia Post Evaluation    Patient location during evaluation: bedside  Patient participation: complete - patient participated  Level of consciousness: awake and alert  Airway patency: patent  Nausea & Vomiting: no nausea  Cardiovascular status: hemodynamically stable  Respiratory status: acceptable  Hydration status: euvolemic  Pain management: adequate      Vitals:    07/31/24 1340 07/31/24 1341 07/31/24 1345 07/31/24 1347   BP: 115/76 115/76 (!) 130/93 (!) 130/93   Pulse: 72 85 72 71   Resp: 13 12 15 13   Temp:  97.5 °F (36.4 °C)  97.5 °F (36.4 °C)   TempSrc:  Temporal  Temporal   SpO2: 99% 98% 97% 98%   Weight:       Height:            No notable events documented.

## 2024-07-31 NOTE — BRIEF OP NOTE
Brief Postoperative Note      Patient: Jesus Olivas  YOB: 1975  MRN: [id number]    Date of Procedure: 7/31/2024    Pre-Op Diagnosis Codes:     * Calculus of ureter [N20.1]    Post-Op Diagnosis: Same, Retained stent       Procedure(s):  CYSTOSCOPY, LEFT URETEROSCOPY, STONE MANIPULATION, LEFT STENT EXCHANGE, stent removal, no exchange was done.    Surgeon(s):  Celestine Ding MD    Assistant:  Surgical Assistant: Amina Hyman    Anesthesia: General    Estimated Blood Loss (mL): Minimal    Complications: None    Specimens:   ID Type Source Tests Collected by Time Destination   1 :  Tissue Tissue  Celestine Ding MD 7/31/2024 1320        Implants:  * No implants in log *      Drains: * No LDAs found *    Findings:  Infection Present At Time Of Surgery (PATOS) (choose all levels that have infection present):  No infection present  Other Findings: Retained stent, left distal stone; lasered and removed.    Electronically signed by Celestine Ding MD on 7/31/2024 at 1:21 PM

## 2024-07-31 NOTE — PROGRESS NOTES
Discharge instructions given to patient and patients friend.  Both deny any questions at this time.Mary Davis RN

## 2024-07-31 NOTE — DISCHARGE INSTRUCTIONS
Patient to call Dr. Ding's office for a follow up appointment in 3-4 weeks.  Office number to call is [phone].  ANESTHESIA DISCHARGE INSTRUCTIONS    You are under the influence of drugs- do not drink alcohol, drive a car, operate machinery(such as power tools, kitchen appliances, etc), sign legal documents, or make any important decisions for 24 hours (or while on pain medications).   Children should not ride bikes or skate boards or play on gym sets  for 24 hours after surgery.  A responsible adult should be with you for 24 hours.  Rest at home today- increase activity as tolerated.  Progress slowly to a regular diet unless your physician has instructed you otherwise. Drink plenty of water.    CALL YOUR DOCTOR IF YOU:  Have moderate to severe nausea or vomiting AND are unable to hold down fluids or prescribed medications.  Have bright red bloody drainage from your dressing that won't stop oozing.  Do not get relief with your pain medication    NORMAL (POSSIBLE) SIDE EFFECTS FROM ANESTHESIA:     Confusion, temporary memory loss, delayed reaction times in the first 24 hours  Lightheadedness, dizziness, difficulty focusing, blurred vision  Nausea/vomiting can happen  Shivering, feeling cold, sore throat, cough and muscle aches should stop within 24-48 hours  Trouble urinating - call your surgeon if it has been more than 8 hrs  Bruising or soreness at the IV site - call if it remains red, firm or there is drainage             FEMALES OF CHILDBEARING AGE WHO ARE TAKING BIRTH CONTROL PILLS:  You may have received a medication during your procedure that interferes with the   actions of birth control pills (Bridion or Emend). Use some other kind of birth control in addition to your pills, like a condom, for 1 month after your procedure to prevent unwanted pregnancy.    The following instructions are to be followed if you have a known history or diagnosis of sleep apnea:  For all sleep apnea patients:  ? Sleep on

## 2024-08-02 LAB
APPEARANCE STONE: NORMAL
COMPN STONE: NORMAL
SPECIMEN WT: 48 MG

## 2024-08-05 NOTE — OP NOTE
[address] [address]                            OPERATIVE REPORT      PATIENT NAME: ALFONSO PERSON              : 1975  MED REC NO: [id number]                      ROOM: [place]  ACCOUNT NO: [id number]                       ADMIT DATE: 2024  PROVIDER: Celestine Ding MD      DATE OF PROCEDURE:  2024    SURGEON:  Celestine Ding MD    PROCEDURES PERFORMED:    1. Left ureteroscopy, holmium laser stone extraction.  2. Cystoscopy, left stent removal.  3. Intraoperative fluoroscopy with interpretation less than 1 hour.    ESTIMATED BLOOD LOSS:  For this procedure, 2 mL.    ANESTHESIA:  General.    OPERATIVE FINDINGS:    1. Indwelling stent.  2. Distal left ureteral stone.    HISTORY AND INDICATION:  48-year-old white female who initially had presented with renal colic and was diagnosed with ureteral calculus.  Attempts at removing this before met with obstruction.  A stent had been placed.  She is now presenting today for definitive care.  The risks, benefits, and expected outcomes of the procedure had been discussed.    DETAILS OF THE PROCEDURE:  After obtaining informed consent, the patient was taken to the operative suite.  She was given general anesthetic and placed on the operative table in a modified dorsal lithotomy position.  Intravenous antibiotics had also been administered.  After making sure all pressure points and joints were well padded, prepping and draping were done in sterile fashion.  Cystourethroscopy was then performed.  The indwelling stent was noted, grasped, removed without difficulty.  Wire was then placed followed by passage of the ureteroscope.  The stone was able to be identified and a holmium laser was used to fracture this into multiple small pieces.  These were subsequently extracted with a Nitinol Zero Tip basket.  Stones were deposited in the patient's bladder.  After

## 2024-08-14 ENCOUNTER — HOSPITAL ENCOUNTER (OUTPATIENT)
Dept: WOMENS IMAGING | Age: 49
Discharge: HOME | End: 2024-08-14
Payer: COMMERCIAL

## 2024-08-14 ENCOUNTER — HOSPITAL ENCOUNTER (OUTPATIENT)
Dept: WOMENS IMAGING | Age: 49
End: 2024-08-14
Payer: COMMERCIAL

## 2024-08-14 VITALS — WEIGHT: 145 LBS | HEIGHT: 67 IN | BODY MASS INDEX: 22.76 KG/M2

## 2024-08-14 DIAGNOSIS — R92.8 ABNORMAL MAMMOGRAM: ICD-10-CM

## 2024-08-14 PROCEDURE — G0279 TOMOSYNTHESIS, MAMMO: HCPCS

## 2024-08-19 ENCOUNTER — PATIENT MESSAGE (OUTPATIENT)
Dept: FAMILY MEDICINE CLINIC | Age: 49
End: 2024-08-19

## 2024-08-23 ENCOUNTER — OFFICE VISIT (OUTPATIENT)
Dept: FAMILY MEDICINE CLINIC | Age: 49
End: 2024-08-23

## 2024-08-23 VITALS
BODY MASS INDEX: 23.76 KG/M2 | HEIGHT: 67 IN | WEIGHT: 151.4 LBS | SYSTOLIC BLOOD PRESSURE: 142 MMHG | HEART RATE: 60 BPM | DIASTOLIC BLOOD PRESSURE: 100 MMHG | RESPIRATION RATE: 16 BRPM | OXYGEN SATURATION: 99 %

## 2024-08-23 DIAGNOSIS — R03.0 ELEVATED BP WITHOUT DIAGNOSIS OF HYPERTENSION: ICD-10-CM

## 2024-08-23 DIAGNOSIS — Z00.00 ANNUAL PHYSICAL EXAM: Primary | ICD-10-CM

## 2024-08-23 DIAGNOSIS — R20.2 PARESTHESIA AND PAIN OF EXTREMITY: ICD-10-CM

## 2024-08-23 DIAGNOSIS — M79.609 PARESTHESIA AND PAIN OF EXTREMITY: ICD-10-CM

## 2024-08-23 LAB
ALBUMIN SERPL-MCNC: 4.2 G/DL (ref 3.4–5)
ALBUMIN/GLOB SERPL: 1.8 {RATIO} (ref 1.1–2.2)
ALP SERPL-CCNC: 70 U/L (ref 40–129)
ALT SERPL-CCNC: 11 U/L (ref 10–40)
ANION GAP SERPL CALCULATED.3IONS-SCNC: 8 MMOL/L (ref 3–16)
AST SERPL-CCNC: 15 U/L (ref 15–37)
BILIRUB SERPL-MCNC: 0.3 MG/DL (ref 0–1)
BUN SERPL-MCNC: 13 MG/DL (ref 7–20)
CALCIUM SERPL-MCNC: 9.4 MG/DL (ref 8.3–10.6)
CHLORIDE SERPL-SCNC: 100 MMOL/L (ref 99–110)
CHOLEST SERPL-MCNC: 249 MG/DL (ref 0–199)
CO2 SERPL-SCNC: 26 MMOL/L (ref 21–32)
CREAT SERPL-MCNC: 0.7 MG/DL (ref 0.6–1.1)
GFR SERPLBLD CREATININE-BSD FMLA CKD-EPI: >90 ML/MIN/{1.73_M2}
GLUCOSE SERPL-MCNC: 75 MG/DL (ref 70–99)
HDLC SERPL-MCNC: 55 MG/DL (ref 40–60)
LDLC SERPL CALC-MCNC: 166 MG/DL
POTASSIUM SERPL-SCNC: 4.4 MMOL/L (ref 3.5–5.1)
PROT SERPL-MCNC: 6.5 G/DL (ref 6.4–8.2)
SODIUM SERPL-SCNC: 134 MMOL/L (ref 136–145)
TRIGL SERPL-MCNC: 138 MG/DL (ref 0–150)
VLDLC SERPL CALC-MCNC: 28 MG/DL

## 2024-08-23 RX ORDER — TRAZODONE HYDROCHLORIDE 50 MG/1
50 TABLET ORAL NIGHTLY PRN
Qty: 90 TABLET | Refills: 0 | Status: SHIPPED | OUTPATIENT
Start: 2024-08-23

## 2024-08-23 ASSESSMENT — PATIENT HEALTH QUESTIONNAIRE - PHQ9
SUM OF ALL RESPONSES TO PHQ QUESTIONS 1-9: 0
2. FEELING DOWN, DEPRESSED OR HOPELESS: NOT AT ALL
3. TROUBLE FALLING OR STAYING ASLEEP: NOT AT ALL
SUM OF ALL RESPONSES TO PHQ QUESTIONS 1-9: 0
SUM OF ALL RESPONSES TO PHQ QUESTIONS 1-9: 0
4. FEELING TIRED OR HAVING LITTLE ENERGY: NOT AT ALL
1. LITTLE INTEREST OR PLEASURE IN DOING THINGS: NOT AT ALL
6. FEELING BAD ABOUT YOURSELF - OR THAT YOU ARE A FAILURE OR HAVE LET YOURSELF OR YOUR FAMILY DOWN: NOT AT ALL
10. IF YOU CHECKED OFF ANY PROBLEMS, HOW DIFFICULT HAVE THESE PROBLEMS MADE IT FOR YOU TO DO YOUR WORK, TAKE CARE OF THINGS AT HOME, OR GET ALONG WITH OTHER PEOPLE: NOT DIFFICULT AT ALL
5. POOR APPETITE OR OVEREATING: NOT AT ALL
SUM OF ALL RESPONSES TO PHQ9 QUESTIONS 1 & 2: 0
8. MOVING OR SPEAKING SO SLOWLY THAT OTHER PEOPLE COULD HAVE NOTICED. OR THE OPPOSITE, BEING SO FIGETY OR RESTLESS THAT YOU HAVE BEEN MOVING AROUND A LOT MORE THAN USUAL: NOT AT ALL
7. TROUBLE CONCENTRATING ON THINGS, SUCH AS READING THE NEWSPAPER OR WATCHING TELEVISION: NOT AT ALL
SUM OF ALL RESPONSES TO PHQ QUESTIONS 1-9: 0
9. THOUGHTS THAT YOU WOULD BE BETTER OFF DEAD, OR OF HURTING YOURSELF: NOT AT ALL

## 2024-08-23 NOTE — PROGRESS NOTES
History and Physical      Jesus Olivas  YOB: 1975    Date of Service:  8/23/2024    Chief Complaint:   Jesus Olivas is a 48 y.o. female who presents for complete physical examination.    HPI: Overall feeling well. She has noted the last few months left arm pain that starts in shoulder to fingertips. It is pins/needles sensation. The pain is intermittent. She is left handed. Manager at [employer]. Most numbness is in thumb and 2/3 fingers.     Wt Readings from Last 3 Encounters:   08/23/24 68.7 kg (151 lb 6.4 oz)   08/14/24 65.8 kg (145 lb)   07/31/24 66.7 kg (147 lb)     BP Readings from Last 3 Encounters:   08/23/24 (!) 142/100   07/31/24 (!) 130/93   07/24/24 132/78       Patient Active Problem List   Diagnosis    Migraines    Migraine with aura, with intractable migraine, so stated, with status migrainosus    Chronic tension-type headache, intractable    Primary insomnia    Dysthymia    High grade squamous intraepithelial cervical dysplasia    Ureteral stone    Inconclusive mammogram       PREVENTIVE HEALTH:   Last eye exam:    yearly- glasses  Hearing concerns: No  Last Dental exam:    Every 6 Months  Caffeine use:  3/week  Exercise:  active job  Diet: feels needs improvement - but overall healthy  Alcohol use: 2/ week  Tobacco/ Vapping/ marijuana use:  smoker  Drug use: no  Mental Health; concerns of anxiety or depression?  no.  PHQ-9 Total Score: 0 (8/23/2024  8:18 AM)  Thoughts that you would be better off dead, or of hurting yourself in some way: 0 (8/23/2024  8:18 AM)     Perform routine skin checks? Yes  Perform monthly self breast exams?  Yes  Last Mammo:   approximate date 8/24 and was normal  Last gyn exam:   < 1 year    Colonoscopy:   10/823 cologuard  Advanced directives: no  Immunization History   Administered Date(s) Administered    COVID-19, MODERNA BLUE border, Primary or Immunocompromised, (age 12y+), IM, 100 mcg/0.5mL 06/07/2021, 07/06/2021    TDaP, ADACEL (age 10y-64y),

## 2024-08-24 LAB — HCV AB SERPL QL IA: NORMAL

## 2024-09-01 SDOH — HEALTH STABILITY: PHYSICAL HEALTH: ON AVERAGE, HOW MANY MINUTES DO YOU ENGAGE IN EXERCISE AT THIS LEVEL?: 30 MIN

## 2024-09-01 SDOH — HEALTH STABILITY: PHYSICAL HEALTH: ON AVERAGE, HOW MANY DAYS PER WEEK DO YOU ENGAGE IN MODERATE TO STRENUOUS EXERCISE (LIKE A BRISK WALK)?: 5 DAYS

## 2024-09-03 NOTE — TELEPHONE ENCOUNTER
Prescription sent on 08/23/24 was sent to [facility] pharmacy. Pt wants it sent to [facility].  Jesus Olivas [phone] (home)    is requesting refill(s) of medication Trazodone 50 mg Tablet to preferred pharmacy [facility]    Last OV 08/23/24 (pertaining to medication)   Last refill 08/23/24 (per medication requested)  Next office visit scheduled or attempted No

## 2024-09-04 ENCOUNTER — OFFICE VISIT (OUTPATIENT)
Dept: ORTHOPEDIC SURGERY | Age: 49
End: 2024-09-04

## 2024-09-04 VITALS — HEIGHT: 67 IN | BODY MASS INDEX: 23.7 KG/M2 | WEIGHT: 151 LBS

## 2024-09-04 DIAGNOSIS — G56.03 BILATERAL CARPAL TUNNEL SYNDROME: Primary | ICD-10-CM

## 2024-09-04 RX ORDER — MELOXICAM 15 MG/1
15 TABLET ORAL DAILY PRN
Qty: 30 TABLET | Refills: 0 | Status: SHIPPED | OUTPATIENT
Start: 2024-09-04

## 2024-09-05 RX ORDER — TRAZODONE HYDROCHLORIDE 50 MG/1
50 TABLET, FILM COATED ORAL NIGHTLY PRN
Qty: 90 TABLET | Refills: 0 | Status: SHIPPED | OUTPATIENT
Start: 2024-09-05

## 2024-09-06 NOTE — PROGRESS NOTES
Hand, Upper Extremity and Reconstructive Surgery                Miriam Gonzalez MD                                           Summary of Upper Extremity Problems and Interventions     Diagnosis: Bilateral hand numbness and tingling    History of Present Illness     Jesus Olivas is a 48 y.o. left hand dominant female who presents with numbness and tingling in her bilateral hands.  Patient reports left greater than right hand numbness and tingling.  Left hand symptoms started approximately 6 months ago the right hand symptoms started 1 month ago.  On the left-hand side, her symptoms of numbness and tingling are constant.  She also reports associated pain and soreness to her hand and wrist.  She reports pain of her hands with gripping and lifting items.  Does report nighttime symptoms on the left.  Denies neck pain or neck symptoms.  On the right side, the symptoms of pain are more significant than the numbness and tingling.  Patient also reports lateral elbow pain.  She was referred by Jenny Castro whose note was reviewed.      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[employer]      Allergies     Allergies   Allergen Reactions    Bee Venom Anaphylaxis    Aloe Other (See Comments)    Glycerin Other (See Comments)    Wheat Germ Oil Hives    Codeine Nausea And Vomiting       Home Medications     Current Outpatient Medications   Medication Instructions    acetaminophen (TYLENOL) 500 mg, Oral, EVERY 6 HOURS PRN    ARIPiprazole (ABILIFY) 15 mg, Oral, DAILY    calcium carbonate-vitamin D 600-200 MG-UNIT TABS Oral, 2 TIMES DAILY    DULoxetine (CYMBALTA) 30 mg, Oral, DAILY    DULoxetine (CYMBALTA) 60 mg, Oral, DAILY    EPINEPHrine 1 MG/10ML SOSY injection PRN    meloxicam (MOBIC) 15 mg, Oral, DAILY PRN    topiramate (TOPAMAX) 100 mg,

## 2024-09-09 ENCOUNTER — HOSPITAL ENCOUNTER (OUTPATIENT)
Dept: MRI IMAGING | Age: 49
Discharge: HOME | End: 2024-09-09
Attending: SURGERY
Payer: COMMERCIAL

## 2024-09-09 DIAGNOSIS — R92.30 DENSE BREASTS: ICD-10-CM

## 2024-09-09 PROCEDURE — 6360000004 HC RX CONTRAST MEDICATION: Performed by: SURGERY

## 2024-09-09 PROCEDURE — A9579 GAD-BASE MR CONTRAST NOS,1ML: HCPCS | Performed by: SURGERY

## 2024-09-09 PROCEDURE — C8908 MRI W/O FOL W/CONT, BREAST,: HCPCS

## 2024-09-09 RX ADMIN — GADOTERIDOL 13 ML: 279.3 INJECTION, SOLUTION INTRAVENOUS at 14:10

## 2024-10-10 ENCOUNTER — TELEPHONE (OUTPATIENT)
Dept: ORTHOPEDIC SURGERY | Age: 49
End: 2024-10-10

## 2024-10-10 NOTE — TELEPHONE ENCOUNTER
General Question     Subject: EMG RESULTS   Patient and /or Facility Request: Jesus Olivas   Contact Number: [phone]     PATIENT REQUESTING A CALL BACK FROM SOMEONE IN DR MENDOZA'S OFFICE WANTING TO KNOW DR MENDOZA RECEIVED HER EMG RESULTS       PLEASE CALL THE PATIENT BACK AT THE ABOVE NUMBER

## 2024-10-16 ENCOUNTER — PREP FOR PROCEDURE (OUTPATIENT)
Dept: ORTHOPEDIC SURGERY | Age: 49
End: 2024-10-16

## 2024-10-16 ENCOUNTER — OFFICE VISIT (OUTPATIENT)
Dept: ORTHOPEDIC SURGERY | Age: 49
End: 2024-10-16
Payer: COMMERCIAL

## 2024-10-16 VITALS — BODY MASS INDEX: 22.76 KG/M2 | WEIGHT: 145 LBS | HEIGHT: 67 IN

## 2024-10-16 DIAGNOSIS — G56.02 CARPAL TUNNEL SYNDROME ON LEFT: ICD-10-CM

## 2024-10-16 DIAGNOSIS — G56.03 BILATERAL CARPAL TUNNEL SYNDROME: Primary | ICD-10-CM

## 2024-10-16 PROCEDURE — 99214 OFFICE O/P EST MOD 30 MIN: CPT | Performed by: STUDENT IN AN ORGANIZED HEALTH CARE EDUCATION/TRAINING PROGRAM

## 2024-10-16 NOTE — PROGRESS NOTES
Marijuana (Weed)    Sexual activity: Yes     Partners: Male       Family History     Family History   Problem Relation Age of Onset    Atrial Fibrillation Mother     Cancer Father         liver    Cancer Brother         breast cancer    Heart Disease Maternal Grandmother     Heart Disease Maternal Grandfather     Breast Cancer Maternal Great Grandmother        Physical Exam     Vital Signs:  Ht 1.702 m (5' 7\")   Wt 65.8 kg (145 lb)   BMI 22.71 kg/m²   BMI: Body mass index is 22.71 kg/m².    General appearance: healthy, alert, no distress, appropriate for stated age  HEENT: normocephalic, atraumatic  Respiratory exam: Breathing easy and unlabored on room air  Cardiovascular: Extremities warm and well perfused with brisk capillary refill, no significant edema.  Lymphatic: No obvious lymphadenopathy  Neuro: Alert and oriented to person, place, time, and situation. No focal, motor, or sensory deficit except as noted below.  Psychiatric: Mood and affect normal and appropriate     Bilateral hand Exam     Skin:  Normal appearance and texture, consistent with age. No rashes. No significant lesions or abnormalities.    Vascular: All fingertips are pink with good capillary refill and of normal temperature.  No edema.  No areas of ischemia or ulcers. Radial pulse 2+.    Joint Stability: No obvious dislocation, subluxation or significant laxity in either upper extremity.    Negative thenar atrophy    No pinpoint tenderness to the hands, diffuse soreness of the hands bilaterally    Sensory Exam: Light touch intact in median and ulnar nerve distributions    Motor Exam:  5/5 strength of APB    Provacative Tests:  Tinel's over carpal tunnel: Positive  Compression test over carpal tunnel: Negative    Tenderness to palpation over the lateral epicondyle of the left upper extremity, positive pain with resisted extension of the wrist.    Radiographs & Electrodiagnostic studies     EMG performed on 10/3/2024-performed by Dr. Salcido.

## 2024-10-29 ENCOUNTER — TELEPHONE (OUTPATIENT)
Dept: ORTHOPEDIC SURGERY | Age: 49
End: 2024-10-29

## 2024-10-29 NOTE — TELEPHONE ENCOUNTER
LMOR FOR PATIENT WITH DIRECT CALL BACK #    [facility] IS CLOSING SO NEED TO RESCHEDULE HER SURGERY TO A DIFFERENT DATE. ABLE TO MOVE TO 11/26/24 AT [facility], IF PATIENT IS AVAILABLE. IF NOT, WILL NEED TO LOOK AT OTHER OPTIONS IN DECEMBER.

## 2024-11-17 SDOH — ECONOMIC STABILITY: FOOD INSECURITY: WITHIN THE PAST 12 MONTHS, YOU WORRIED THAT YOUR FOOD WOULD RUN OUT BEFORE YOU GOT MONEY TO BUY MORE.: SOMETIMES TRUE

## 2024-11-17 SDOH — ECONOMIC STABILITY: INCOME INSECURITY: HOW HARD IS IT FOR YOU TO PAY FOR THE VERY BASICS LIKE FOOD, HOUSING, MEDICAL CARE, AND HEATING?: NOT VERY HARD

## 2024-11-17 SDOH — ECONOMIC STABILITY: TRANSPORTATION INSECURITY
IN THE PAST 12 MONTHS, HAS LACK OF TRANSPORTATION KEPT YOU FROM MEETINGS, WORK, OR FROM GETTING THINGS NEEDED FOR DAILY LIVING?: NO

## 2024-11-17 SDOH — ECONOMIC STABILITY: FOOD INSECURITY: WITHIN THE PAST 12 MONTHS, THE FOOD YOU BOUGHT JUST DIDN'T LAST AND YOU DIDN'T HAVE MONEY TO GET MORE.: NEVER TRUE

## 2024-11-18 NOTE — PROGRESS NOTES
Jesus Brendon    Age 49 y.o.    female    1975    MRN [id number]    11/26/2024  Arrival Time_____________  OR Time____________45 Min     Procedure(s):  LEFT CARPAL TUNNEL RELEASE                      Monitor Anesthesia Care    Surgeon(s):  Miriam Gonzalez, MD       Phone [phone] (home)     Initals  Date  Info Source  Home  Cell         Work  _____________________________________________________________________  _____________________________________________________________________  _____________________________________________________________________  _____________________________________________________________________  _____________________________________________________________________    PCP _____________________________ Phone_________________     H&P  ________________  Bringing      Chart              Epic      DOS      Called________  EKG ________________   Bringing      Chart              Epic      DOS      Called________  LABS________________   Bringing     Chart              Epic      DOS      Called________  Cardiac Clearance ______ Bringing      Chart              Epic      DOS      Called________  Pulmonary Clearance____ Bringing      Chart              Epic      DOS      Called________    Cardiologist________________________ Phone___________________________  Pulmonologist_______________________Phone___________________________    ? Advance Directives   ? Mandaen concerns / Waiver on Chart            PAT Communications________________  ? Pre-op Instructions Given /Understood          _________________________________  ? Directions to Surgery Center                          _________________________________  ? Transportation Home_______________      __________________________________  ? Crutches/Walker__________________        __________________________________    Orders: Hard copy/ EPIC                 Transcribed/ EPIC              _______Wt.    ________Pharmacy

## 2024-11-19 ENCOUNTER — TELEPHONE (OUTPATIENT)
Dept: ORTHOPEDIC SURGERY | Age: 49
End: 2024-11-19

## 2024-11-20 ENCOUNTER — OFFICE VISIT (OUTPATIENT)
Dept: OBGYN CLINIC | Age: 49
End: 2024-11-20

## 2024-11-20 VITALS
WEIGHT: 145.5 LBS | HEIGHT: 67 IN | OXYGEN SATURATION: 98 % | DIASTOLIC BLOOD PRESSURE: 70 MMHG | BODY MASS INDEX: 22.84 KG/M2 | SYSTOLIC BLOOD PRESSURE: 116 MMHG | HEART RATE: 68 BPM

## 2024-11-20 DIAGNOSIS — Z98.890 HISTORY OF LOOP ELECTRICAL EXCISION PROCEDURE (LEEP): Primary | ICD-10-CM

## 2024-11-20 NOTE — PROGRESS NOTES
Date and time of surgery : 11/26/24 at 0900             Arrival Time: 0700      Bring Picture ID and insurance card.  Please wear simple, loose fitting clothing to the hospital.   Do not bring valuables (money, credit cards, checkbooks, etc.)   Do not wear any makeup (including  eye makeup) and no nail polish or artificial nails on your fingers or toes.  DO NOT wear any jewelry or piercings on day of surgery.  All body piercing jewelry must be removed.  If you have dentures, they will be removed before going to the OR; we will provide you a container.  If you wear contact lenses or glasses, they will be removed; please bring a case for them.  Shower the evening before or morning of surgery with antibacterial soap.  Nothing to eat or drink after midnight the day before surgery.   You may brush your teeth and gargle the morning of surgery.  DO NOT SWALLOW WATER.   Do not take any morning meds the day of your surgery.  Aspirin, Ibuprofen, Advil, Naproxen, Vitamin E and other Anti-inflammatory products and supplements should be stopped for 5 -7days before surgery or as directed by your physician.  Do not smoke or drink any alcoholic beverages 24 hours prior to surgery.  This includes NA Beer. Refrain from the usage of any recreational drugs, including non-prescribed prescription drugs.   You MUST plan for a responsible adult to stay on site while you are here and take you home after your surgery. You will not be allowed to leave alone or drive yourself home. It is strongly suggested someone stay with you the first 24 hrs. Your surgery will be cancelled if you do not have a ride home.  To help prevent infection, change your sheets the night before surgery.   If you  have a Living Will and Durable Power of  for Healthcare, please bring in a copy.  Notify your Surgeon if you develop any illness between now and time of surgery. Cough, cold, fever, sore throat, nausea, vomiting, etc.  Please notify your surgeon if

## 2024-11-20 NOTE — TELEPHONE ENCOUNTER
Jesus Brendon [phone] (home)    is requesting refill(s) of medication Aripiprazole 15 mg Tablet, Duloxetine 30 mg ER Capsule, Duloxetine 60 mg ER Capsule, and Verapamil 100 mg ER Capsule to preferred pharmacy [facility]    Last OV 08/23/24 (pertaining to medication)   Last refill 09/15/23 for all medications (per medication requested)  Next office visit scheduled or attempted No  Not sure when pt is due for appt

## 2024-11-20 NOTE — PROGRESS NOTES
[facility]     CC:   Chief Complaint   Patient presents with    Follow-up     6 month follow up pap        HPI:  49 y.o.  presents with:   49 year old female  [facility] 7- presents for repeat pap smear. She reports some issues with constipation but no other complaints.          Medications:  Current Outpatient Medications   Medication Sig Dispense Refill    traZODone (DESYREL) 50 MG tablet TAKE 1 TABLET BY MOUTH NIGHTLY AS NEEDED FOR SLEEP 90 tablet 0    meloxicam (MOBIC) 15 MG tablet Take 1 tablet by mouth daily as needed for Pain 30 tablet 0    Ubrogepant (UBRELVY) 100 MG TABS Take 100mg tablet by mouth q48h as needed for migraine. MAX dosage 2 tablets a week 16 tablet 0    acetaminophen (TYLENOL) 500 MG tablet Take 1 tablet by mouth every 6 hours as needed for Pain      DULoxetine (CYMBALTA) 30 MG extended release capsule Take 1 capsule by mouth daily (Patient taking differently: Take 1 capsule by mouth nightly Takes with 60 mg for total of 90 mg daily) 90 capsule 1    DULoxetine (CYMBALTA) 60 MG extended release capsule Take 1 capsule by mouth daily (Patient taking differently: Take 1 capsule by mouth nightly Takes with 30 mg for total of 90 mg daily) 90 capsule 1    verapamil (VERELAN PM) 100 MG CP24 extended release capsule Take 1 capsule by mouth daily (Patient taking differently: Take 1 capsule by mouth nightly) 90 capsule 1    topiramate (TOPAMAX) 100 MG tablet Take 1 tablet by mouth daily 90 tablet 1    ARIPiprazole (ABILIFY) 15 MG tablet Take 1 tablet by mouth daily 90 tablet 1    calcium carbonate-vitamin D 600-200 MG-UNIT TABS Take by mouth 2 times daily      EPINEPHrine 1 MG/10ML SOSY injection as needed       No current facility-administered medications for this visit.       Allergies:  Allergies   Allergen Reactions    Bee Venom Anaphylaxis    Aloe Other (See Comments)    Glycerin Other (See Comments)    Wheat Germ Oil Hives    Codeine Nausea And Vomiting       ROS:  Review of

## 2024-11-21 LAB
HPV HR 12 DNA SPEC QL NAA+PROBE: NOT DETECTED
HPV16 DNA SPEC QL NAA+PROBE: NOT DETECTED
HPV16+18+H RISK 12 DNA SPEC-IMP: NORMAL
HPV18 DNA SPEC QL NAA+PROBE: NOT DETECTED

## 2024-11-21 RX ORDER — ARIPIPRAZOLE 15 MG/1
15 TABLET ORAL DAILY
Qty: 90 TABLET | Refills: 1 | Status: SHIPPED | OUTPATIENT
Start: 2024-11-21

## 2024-11-21 RX ORDER — DULOXETIN HYDROCHLORIDE 60 MG/1
60 CAPSULE, DELAYED RELEASE ORAL
Qty: 90 CAPSULE | Refills: 1 | Status: SHIPPED | OUTPATIENT
Start: 2024-11-21

## 2024-11-21 RX ORDER — DULOXETIN HYDROCHLORIDE 30 MG/1
30 CAPSULE, DELAYED RELEASE ORAL
Qty: 90 CAPSULE | Refills: 1 | Status: SHIPPED | OUTPATIENT
Start: 2024-11-21

## 2024-11-21 RX ORDER — VERAPAMIL HYDROCHLORIDE 100 MG/1
1 CAPSULE, EXTENDED RELEASE ORAL DAILY
Qty: 90 CAPSULE | Refills: 1 | Status: SHIPPED | OUTPATIENT
Start: 2024-11-21

## 2024-11-21 ASSESSMENT — ENCOUNTER SYMPTOMS: CONSTIPATION: 1

## 2024-11-21 NOTE — TELEPHONE ENCOUNTER
Pt was told that based on the dose and medications that pt is taking she should see psychiatrist. Pt states she has not tried to look for psychiatrist because she thought she did not need to. Pt states she will look for psychiatrist, but will need temporary supply until she finds the psychiatrist.Pt was asked if she wanted list of recommendations for psychiatrist. Pt asked if list of psychiatrist can be sent via [employer]. Pt was sent the recommendation list via [employer] message.

## 2024-11-25 ENCOUNTER — ANESTHESIA EVENT (OUTPATIENT)
Dept: OPERATING ROOM | Age: 49
End: 2024-11-25
Payer: COMMERCIAL

## 2024-11-26 ENCOUNTER — ANESTHESIA (OUTPATIENT)
Dept: OPERATING ROOM | Age: 49
End: 2024-11-26
Payer: COMMERCIAL

## 2024-11-26 ENCOUNTER — HOSPITAL ENCOUNTER (OUTPATIENT)
Age: 49
Setting detail: OUTPATIENT SURGERY
Discharge: HOME | End: 2024-11-26
Attending: STUDENT IN AN ORGANIZED HEALTH CARE EDUCATION/TRAINING PROGRAM | Admitting: STUDENT IN AN ORGANIZED HEALTH CARE EDUCATION/TRAINING PROGRAM
Payer: COMMERCIAL

## 2024-11-26 VITALS
DIASTOLIC BLOOD PRESSURE: 94 MMHG | WEIGHT: 145 LBS | TEMPERATURE: 97.6 F | SYSTOLIC BLOOD PRESSURE: 142 MMHG | HEIGHT: 67 IN | BODY MASS INDEX: 22.76 KG/M2 | OXYGEN SATURATION: 100 % | HEART RATE: 69 BPM | RESPIRATION RATE: 16 BRPM

## 2024-11-26 DIAGNOSIS — G56.02 CARPAL TUNNEL SYNDROME ON LEFT: Primary | ICD-10-CM

## 2024-11-26 LAB — HCG UR QL: NEGATIVE

## 2024-11-26 PROCEDURE — 6360000002 HC RX W HCPCS: Performed by: STUDENT IN AN ORGANIZED HEALTH CARE EDUCATION/TRAINING PROGRAM

## 2024-11-26 PROCEDURE — 6360000002 HC RX W HCPCS: Performed by: NURSE ANESTHETIST, CERTIFIED REGISTERED

## 2024-11-26 PROCEDURE — 7100000010 HC PHASE II RECOVERY - FIRST 15 MIN: Performed by: STUDENT IN AN ORGANIZED HEALTH CARE EDUCATION/TRAINING PROGRAM

## 2024-11-26 PROCEDURE — 3700000000 HC ANESTHESIA ATTENDED CARE: Performed by: STUDENT IN AN ORGANIZED HEALTH CARE EDUCATION/TRAINING PROGRAM

## 2024-11-26 PROCEDURE — 3600000005 HC SURGERY LEVEL 5 BASE: Performed by: STUDENT IN AN ORGANIZED HEALTH CARE EDUCATION/TRAINING PROGRAM

## 2024-11-26 PROCEDURE — 3600000015 HC SURGERY LEVEL 5 ADDTL 15MIN: Performed by: STUDENT IN AN ORGANIZED HEALTH CARE EDUCATION/TRAINING PROGRAM

## 2024-11-26 PROCEDURE — 2500000003 HC RX 250 WO HCPCS: Performed by: ANESTHESIOLOGY

## 2024-11-26 PROCEDURE — 2580000003 HC RX 258: Performed by: ANESTHESIOLOGY

## 2024-11-26 PROCEDURE — 2709999900 HC NON-CHARGEABLE SUPPLY: Performed by: STUDENT IN AN ORGANIZED HEALTH CARE EDUCATION/TRAINING PROGRAM

## 2024-11-26 PROCEDURE — 3700000001 HC ADD 15 MINUTES (ANESTHESIA): Performed by: STUDENT IN AN ORGANIZED HEALTH CARE EDUCATION/TRAINING PROGRAM

## 2024-11-26 PROCEDURE — 2580000003 HC RX 258: Performed by: STUDENT IN AN ORGANIZED HEALTH CARE EDUCATION/TRAINING PROGRAM

## 2024-11-26 PROCEDURE — A4217 STERILE WATER/SALINE, 500 ML: HCPCS | Performed by: STUDENT IN AN ORGANIZED HEALTH CARE EDUCATION/TRAINING PROGRAM

## 2024-11-26 PROCEDURE — 2500000003 HC RX 250 WO HCPCS: Performed by: NURSE ANESTHETIST, CERTIFIED REGISTERED

## 2024-11-26 PROCEDURE — 84703 CHORIONIC GONADOTROPIN ASSAY: CPT

## 2024-11-26 PROCEDURE — 7100000011 HC PHASE II RECOVERY - ADDTL 15 MIN: Performed by: STUDENT IN AN ORGANIZED HEALTH CARE EDUCATION/TRAINING PROGRAM

## 2024-11-26 RX ORDER — IPRATROPIUM BROMIDE AND ALBUTEROL SULFATE 2.5; .5 MG/3ML; MG/3ML
1 SOLUTION RESPIRATORY (INHALATION)
Status: DISCONTINUED | OUTPATIENT
Start: 2024-11-26 | End: 2024-11-26 | Stop reason: HOSPADM

## 2024-11-26 RX ORDER — SODIUM CHLORIDE 0.9 % (FLUSH) 0.9 %
5-40 SYRINGE (ML) INJECTION PRN
Status: DISCONTINUED | OUTPATIENT
Start: 2024-11-26 | End: 2024-11-26 | Stop reason: HOSPADM

## 2024-11-26 RX ORDER — PROPOFOL 10 MG/ML
INJECTION, EMULSION INTRAVENOUS
Status: DISCONTINUED | OUTPATIENT
Start: 2024-11-26 | End: 2024-11-26 | Stop reason: SDUPTHER

## 2024-11-26 RX ORDER — SODIUM CHLORIDE 9 MG/ML
INJECTION, SOLUTION INTRAVENOUS PRN
Status: DISCONTINUED | OUTPATIENT
Start: 2024-11-26 | End: 2024-11-26 | Stop reason: HOSPADM

## 2024-11-26 RX ORDER — MAGNESIUM HYDROXIDE 1200 MG/15ML
LIQUID ORAL CONTINUOUS PRN
Status: COMPLETED | OUTPATIENT
Start: 2024-11-26 | End: 2024-11-26

## 2024-11-26 RX ORDER — LIDOCAINE HYDROCHLORIDE 20 MG/ML
INJECTION, SOLUTION INFILTRATION; PERINEURAL
Status: DISCONTINUED | OUTPATIENT
Start: 2024-11-26 | End: 2024-11-26 | Stop reason: SDUPTHER

## 2024-11-26 RX ORDER — NALOXONE HYDROCHLORIDE 0.4 MG/ML
INJECTION, SOLUTION INTRAMUSCULAR; INTRAVENOUS; SUBCUTANEOUS PRN
Status: DISCONTINUED | OUTPATIENT
Start: 2024-11-26 | End: 2024-11-26 | Stop reason: HOSPADM

## 2024-11-26 RX ORDER — SODIUM CHLORIDE 0.9 % (FLUSH) 0.9 %
5-40 SYRINGE (ML) INJECTION EVERY 12 HOURS SCHEDULED
Status: DISCONTINUED | OUTPATIENT
Start: 2024-11-26 | End: 2024-11-26 | Stop reason: HOSPADM

## 2024-11-26 RX ORDER — HYDROCODONE BITARTRATE AND ACETAMINOPHEN 5; 325 MG/1; MG/1
1 TABLET ORAL EVERY 6 HOURS PRN
Qty: 10 TABLET | Refills: 0 | Status: SHIPPED | OUTPATIENT
Start: 2024-11-26 | End: 2024-12-03

## 2024-11-26 RX ORDER — ONDANSETRON 2 MG/ML
4 INJECTION INTRAMUSCULAR; INTRAVENOUS
Status: DISCONTINUED | OUTPATIENT
Start: 2024-11-26 | End: 2024-11-26 | Stop reason: HOSPADM

## 2024-11-26 RX ORDER — MIDAZOLAM HYDROCHLORIDE 1 MG/ML
INJECTION, SOLUTION INTRAMUSCULAR; INTRAVENOUS
Status: DISCONTINUED | OUTPATIENT
Start: 2024-11-26 | End: 2024-11-26 | Stop reason: SDUPTHER

## 2024-11-26 RX ORDER — SODIUM CHLORIDE, SODIUM LACTATE, POTASSIUM CHLORIDE, CALCIUM CHLORIDE 600; 310; 30; 20 MG/100ML; MG/100ML; MG/100ML; MG/100ML
INJECTION, SOLUTION INTRAVENOUS CONTINUOUS
Status: DISCONTINUED | OUTPATIENT
Start: 2024-11-26 | End: 2024-11-26 | Stop reason: HOSPADM

## 2024-11-26 RX ADMIN — MIDAZOLAM 2 MG: 1 INJECTION INTRAMUSCULAR; INTRAVENOUS at 08:47

## 2024-11-26 RX ADMIN — LIDOCAINE HYDROCHLORIDE 80 MG: 20 INJECTION, SOLUTION INFILTRATION; PERINEURAL at 08:51

## 2024-11-26 RX ADMIN — Medication 10 MG: at 08:59

## 2024-11-26 RX ADMIN — FAMOTIDINE 20 MG: 10 INJECTION, SOLUTION INTRAVENOUS at 07:31

## 2024-11-26 RX ADMIN — Medication 10 MG: at 08:47

## 2024-11-26 RX ADMIN — PROPOFOL 400 MG: 10 INJECTION, EMULSION INTRAVENOUS at 08:51

## 2024-11-26 ASSESSMENT — PAIN SCALES - GENERAL
PAINLEVEL_OUTOF10: 0

## 2024-11-26 ASSESSMENT — LIFESTYLE VARIABLES: SMOKING_STATUS: 1

## 2024-11-26 ASSESSMENT — PAIN - FUNCTIONAL ASSESSMENT: PAIN_FUNCTIONAL_ASSESSMENT: 0-10

## 2024-11-26 NOTE — H&P
Hand, Upper Extremity and Reconstructive Surgery                Miriam Gonzalez MD                                           Summary of Upper Extremity Problems and Interventions     Diagnosis: Bilateral hand numbness and tingling    History of Present Illness     Jesus Olivas is a 49 y.o. left hand dominant female who presents with numbness and tingling in her bilateral hands.  Patient reports left greater than right hand numbness and tingling.  Left hand symptoms started approximately 6 months ago the right hand symptoms started 1 month ago.  On the left-hand side, her symptoms of numbness and tingling are constant.  She also reports associated pain and soreness to her hand and wrist.  She reports pain of her hands with gripping and lifting items.  Does report nighttime symptoms on the left.  Denies neck pain or neck symptoms.  On the right side, the symptoms of pain are more significant than the numbness and tingling.  Patient also reports lateral elbow pain.  She was referred by Jenny Castro whose note was reviewed.    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[employer]    Review of Systems - Negative except left carpal tunnel syndrome   Allergies     Allergies   Allergen Reactions    Bee Venom Anaphylaxis    Aloe Other (See Comments)    Glycerin Other (See Comments)    Wheat Germ Oil Hives    Codeine Nausea And Vomiting       Home Medications     Current Outpatient Medications   Medication Instructions    acetaminophen (TYLENOL) 500 mg, Oral, EVERY 6 HOURS PRN    ARIPiprazole (ABILIFY) 15 mg, Oral, DAILY    calcium carbonate-vitamin D 600-200 MG-UNIT TABS Oral, 2 TIMES DAILY    DULoxetine (CYMBALTA) 60 mg, Oral, EVERY BEDTIME, Takes with 30 mg for total of 90 mg daily    DULoxetine (CYMBALTA) 30 mg, Oral, EVERY BEDTIME, Takes with 60 mg for total of 90 mg

## 2024-11-26 NOTE — OP NOTE
OPERATIVE NOTE     Patient Name: Jesus Olivas   YOB: 1975  MRN:  [id number]    Date of Procedure:  11/26/2024  Surgeon: Miriam Gonzalez MD    PRE-OPERATIVE DIAGNOSIS:  Left carpal tunnel syndrome    POST-OPERATIVE DIAGNOSIS:  Left carpal tunnel syndrome    PROCEDURE:  Left carpal tunnel release    ANESTHESIA: MAC and Local    OPERATIVE INDICATIONS: The patient is a very pleasant 49 y.o. female who has hand pain and paraesthesias associated with carpal tunnel syndrome. The clinical and diagnostic workup are supportive of the diagnosis. The patient is symptomatic and failed non-operative management. We discussed risks of surgery including nerve injury, pillar pain, persistent numbness, infection, scarring, bleeding and pain.  After thorough discussion the patient desires carpal tunnel release.    OPERATIVE PROCEDURE: The patient was seen in the preoperative holding area. The operative procedure confirmed and the operative site was marked with an indelible marker. The patient was brought to the operating room and placed supine on the table. A hand table was placed on the patient's side.  A tourniquet was placed on the patient's forearm.   A timeout was performed which correctly identified the team members, the procedure to be performed as well as the operative site.  Local anesthesia was then injected into the surgical site using 10 cc of 1:1 1% lidocaine and 0.5% bupivacaine. Next, the arm was prepped and draped in a sterile fashion.     The arm was exsanguinated using an Esmarch bandage and then tourniquet was inflated to 250 mmHg.  A 2-3 cm incision was designed just ulnar to the thenar crease.  Incision was performed with a 15 blade.  Sharp dissection was proceeded down to the palmar fascia which is cut with a knife.  Retractors were deepened to the level of the transverse carpal ligament.  The transverse carpal ligament was cleaned with a sponge.  It was evaluated for any transligamentous

## 2024-11-26 NOTE — DISCHARGE INSTRUCTIONS
ORTHOPAEDICS AND SPORTS MEDICINE           HAND SURGERY - RAY MENDOZA MD                              POST-OPERATIVE DISCHARGE INSTRUCTIONS    PRESCRIPTIONS:  You have been given a prescription to be taken as directed for post-operative pain control.  Take over-the-counter Colace, 100mg by mouth twice a day while taking narcotic pain medications to help prevent constipation.  You may also take over the counter ibuprofen/aleve and tylenol for pain. Take this as directed on the packaging. Do not exceed 3000 mg tylenol/acetaminophen in 24 hours.     Ibuprofen 600-800 mg (3-4) tablets by mouth every 6 hours as needed for pain.      OR   Aleve 2 tablets by mouth every 12 hours (twice daily) as needed for pain.      AND/OR   Tylenol 1000 mg (2 tablets) every 8 hours as needed for pain.    4. Please use your pain medication carefully, as refills are limited and you may not be provided with one.   5. Prescriptions are only filled in person during a clinic visit.   6. As stated above, please  use over the counter pain medicine - it will also be helpful with decreasing your swelling.       ANESTHESIA:  1.After your surgery, post-surgical discomfort or pain is likely. This discomfort can last several days to a few weeks. At certain times of the day your discomfort may be more intense.     2. Did you receive a nerve block? A nerve block can provide pain relief for one hour to two days after your surgery. As long as the nerve block is working, you will experience little or no sensation in the area the surgeon operated on. As the nerve block wears off, you will begin to experience pain or discomfort. It is very important that you begin taking your prescribed pain medication before the nerve block fully wears off. Treating your pain at the first sign of the block wearing off will ensure your pain is better controlled and more tolerable when full-sensation returns. Do not wait until the pain is intolerable, as the

## 2024-11-26 NOTE — ANESTHESIA PRE PROCEDURE
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   • CLAUDIO STEROTACTIC LOC BREAST BIOPSY RIGHT Right 01/22/2024    CLAUDIO STEROTACTIC LOC BREAST BIOPSY RIGHT 1/22/2024 [facility]   • SMALL INTESTINE SURGERY  2015    partial volvulus-resected       Social History:    Social History     Tobacco Use   • Smoking status: Every Day     Current packs/day: 0.50     Average packs/day: 0.5 packs/day for 20.0 years (10.0 ttl pk-yrs)     Types: Cigarettes   • Smokeless tobacco: Never   Substance Use Topics   • Alcohol use: Yes     Alcohol/week: 3.0 standard drinks of alcohol     Types: 3 Glasses of wine per week                                Ready to quit: Not Answered  Counseling given: Not Answered      Vital Signs (Current):   Vitals:    11/20/24 1247 11/26/24 0716   BP:  (!) 132/93   Pulse:  80   Resp:  18   Temp:  98.8 °F (37.1 °C)   TempSrc:  Oral   SpO2:  100%   Weight: 65.8 kg (145 lb)    Height: 1.702 m (5' 7\")                                               BP Readings from Last 3 Encounters:   11/26/24 (!) 132/93   11/20/24 116/70   08/23/24 (!) 142/100       NPO Status: Time of last liquid consumption: 1800                        Time of last solid consumption: 1800                        Date of last liquid consumption: 11/25/24                        Date of last solid food consumption: 11/25/24    BMI:   Wt Readings from Last 3 Encounters:   11/20/24 65.8 kg (145 lb)   11/20/24 66 kg (145 lb 8 oz)   10/16/24 65.8 kg (145 lb)     Body mass index is 22.71 kg/m².    CBC:   Lab Results   Component Value Date/Time    WBC 19.2 06/13/2024 05:38 AM    RBC 3.80 06/13/2024 05:38 AM    HGB 12.2 06/13/2024 05:38 AM    HCT 36.4 06/13/2024 05:38 AM    MCV 95.7 06/13/2024 05:38 AM    RDW 14.4 06/13/2024 05:38 AM     06/13/2024 05:38 AM       CMP:   Lab Results   Component Value Date/Time     08/23/2024 08:40 AM    K 4.4 08/23/2024 08:40 AM    K 4.5 07/31/2024 10:56 AM     08/23/2024 08:40 AM    CO2 26 08/23/2024 08:40 AM    BUN 13

## 2024-11-26 NOTE — ANESTHESIA POSTPROCEDURE EVALUATION
Department of Anesthesiology  Postprocedure Note    Patient: Jesus Olivas  MRN: [id number]  YOB: 1975  Date of evaluation: 11/26/2024    Procedure Summary       Date: 11/26/24 Room / Location: [address]    Anesthesia Start: 0847 Anesthesia Stop: 0922    Procedure: LEFT CARPAL TUNNEL RELEASE (Left: Hand) Diagnosis:       Carpal tunnel syndrome on left      (Carpal tunnel syndrome on left [G56.02])    Surgeons: Miriam Gonzalez MD Responsible Provider: Melvin Eli MD    Anesthesia Type: MAC ASA Status: 2            Anesthesia Type: No value filed.    Mathew Phase I: Mathew Score: 10    Mathew Phase II: Mathew Score: 10    Anesthesia Post Evaluation    Patient location during evaluation: PACU  Patient participation: complete - patient participated  Level of consciousness: awake and alert  Airway patency: patent  Nausea & Vomiting: no nausea and no vomiting  Cardiovascular status: hemodynamically stable  Respiratory status: acceptable  Hydration status: euvolemic  Pain management: adequate    No notable events documented.

## 2024-12-06 ENCOUNTER — OFFICE VISIT (OUTPATIENT)
Dept: ORTHOPEDIC SURGERY | Age: 49
End: 2024-12-06

## 2024-12-06 VITALS — BODY MASS INDEX: 22.76 KG/M2 | WEIGHT: 145 LBS | HEIGHT: 67 IN

## 2024-12-06 DIAGNOSIS — G56.02 CARPAL TUNNEL SYNDROME ON LEFT: Primary | ICD-10-CM

## 2024-12-06 PROCEDURE — 99024 POSTOP FOLLOW-UP VISIT: CPT | Performed by: STUDENT IN AN ORGANIZED HEALTH CARE EDUCATION/TRAINING PROGRAM

## 2024-12-06 NOTE — PROGRESS NOTES
Hand, Upper Extremity and Reconstructive Surgery                Miriam Gonzalez MD                                           Summary of Upper Extremity Problems and Interventions     Diagnosis: Bilateral hand numbness and tingling  Surgery-left carpal tunnel release, date of surgery 11/26/2024    History of Present Illness     Interval update 12/6/24: Patient presents for follow-up of the above surgery.  She has some tenderness but overall is well-controlled.  Her paresthesias have resolved.  She has no nighttime symptoms.  She has some pain at her MCP joints of the index and long fingers that she has noticed recently. She continues to have right handed paresthesias.    Jesus Olivas is a 49 y.o. left hand dominant female who presents with numbness and tingling in her bilateral hands.  Patient reports left greater than right hand numbness and tingling.  Left hand symptoms started approximately 6 months ago the right hand symptoms started 1 month ago.  On the left-hand side, her symptoms of numbness and tingling are constant.  She also reports associated pain and soreness to her hand and wrist.  She reports pain of her hands with gripping and lifting items.  Does report nighttime symptoms on the left.  Denies neck pain or neck symptoms.  On the right side, the symptoms of pain are more significant than the numbness and tingling.  Patient also reports lateral elbow pain.  She was referred by Jenny Castro whose note was reviewed.    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[employer]      Allergies     Allergies   Allergen Reactions    Bee Venom Anaphylaxis    Aloe Other (See Comments)    Glycerin Other (See Comments)    Wheat Germ Oil Hives    Codeine Nausea And Vomiting       Home Medications     Current Outpatient Medications   Medication Instructions

## 2024-12-09 NOTE — TELEPHONE ENCOUNTER
Jesussadi Olivas [phone] (home)    is requesting refill(s) of medication Topiramate 100 mg Tablet to preferred pharmacy [facility]    Last OV 08/23/24 (pertaining to medication)   Last refill 09/15/23 (per medication requested)  Next office visit scheduled or attempted No  Due on 08/25/25

## 2024-12-12 RX ORDER — TOPIRAMATE 100 MG/1
100 TABLET, FILM COATED ORAL DAILY
Qty: 90 TABLET | Refills: 1 | Status: SHIPPED | OUTPATIENT
Start: 2024-12-12

## 2024-12-13 ENCOUNTER — TELEPHONE (OUTPATIENT)
Dept: ORTHOPEDIC SURGERY | Age: 49
End: 2024-12-13

## 2024-12-16 ENCOUNTER — TELEPHONE (OUTPATIENT)
Dept: ORTHOPEDIC SURGERY | Age: 49
End: 2024-12-16

## 2024-12-16 NOTE — TELEPHONE ENCOUNTER
Faxed completed form to [phone] and emailed to [email or web address]

## 2024-12-18 ENCOUNTER — TELEPHONE (OUTPATIENT)
Dept: ORTHOPEDIC SURGERY | Age: 49
End: 2024-12-18

## 2024-12-18 NOTE — TELEPHONE ENCOUNTER
Faxed completed return to work form to [facility] @ [phone]    Claim #  [id number]

## (undated) DEVICE — SYRINGE MED 10ML LUERLOCK TIP W/O SFTY DISP

## (undated) DEVICE — GLOVE SURG SZ 65 L12IN FNGR THK79MIL GRN LTX FREE

## (undated) DEVICE — SUTURE N ABSRB L 18 IN SZ 4-0 NDL L 19 MM NYL MONOFILAMENT

## (undated) DEVICE — NEEDLE HYPO 25GA L1.5IN BLU POLYPR HUB S STL REG BVL STR

## (undated) DEVICE — NEEDLE HYPO 18GA L1.5IN PNK POLYPR HUB S STL REG BVL STR

## (undated) DEVICE — GAUZE,SPONGE,4"X4",8PLY,STRL,LF,10/TRAY: Brand: MEDLINE

## (undated) DEVICE — GLOVE SURG SZ 65 L12IN FNGR THK94MIL STD WHT LTX FREE

## (undated) DEVICE — OPEN-END FLEXI-TIP URETERAL CATHETER: Brand: FLEXI-TIP

## (undated) DEVICE — MINOR SET UP PACK: Brand: MEDLINE INDUSTRIES, INC.

## (undated) DEVICE — GLOVE ORANGE PI 7 1/2   MSG9075

## (undated) DEVICE — GLOVE,SURG,SENSICARE,ALOE,LF,PF,6: Brand: MEDLINE

## (undated) DEVICE — ELECTRODE ES M S STL RUB LEEP FISCHER CONE BX EXCISOR

## (undated) DEVICE — ZIMMER® STERILE DISPOSABLE TOURNIQUET CUFF WITH PLC, DUAL PORT, SINGLE BLADDER, 18 IN. (46 CM)

## (undated) DEVICE — ELECTRODE BALL DIA5MM TUNGSTEN LLETZ DURABLE RESIST

## (undated) DEVICE — MHCZ CYSTO: Brand: MEDLINE INDUSTRIES, INC.

## (undated) DEVICE — SOLUTION IRRIGATION STRL H2O 1000 ML UROMATIC CONTAINER

## (undated) DEVICE — PAD,NON-ADHERENT,3X8,STERILE,LF,1/PK: Brand: MEDLINE

## (undated) DEVICE — TUBING ELECSURG SPECLM W/ ADPT DISP

## (undated) DEVICE — ELECTRODE PT RET AD L9FT HI MOIST COND ADH HYDRGEL CORDED

## (undated) DEVICE — CYSTO: Brand: MEDLINE INDUSTRIES, INC.

## (undated) DEVICE — UNDERPAD HOSP W30XL36IN GRN POLYPR HI ABSRB DISP

## (undated) DEVICE — BENTSON WIRE GUIDE 20CM DISTAL FLEXIBILITY WITH SOFTENED TIP: Brand: BENTSON

## (undated) DEVICE — CIRCUIT ANES L72IN 3L BACT AND VIR FLTR EL CONN SGL LIMB

## (undated) DEVICE — CORD ES L12FT BPLR FRCP

## (undated) DEVICE — SKIN PREP TRAY W/CHG: Brand: MEDLINE INDUSTRIES, INC.

## (undated) DEVICE — FLEXIVA  PULSE  AND  FLEXIVA  PULSE  TRACTIP  LASER  FIBERS  ARE  HIGH  POWER  SINGLE-USE FIBER: Brand: FLEXIVA PULSE ID

## (undated) DEVICE — BANDAGE COMPR W2INXL5YD WHT BGE POLY COT M E WRP WV HK AND

## (undated) DEVICE — SYRINGE MED 10ML TRNSLUC BRL PLUNG BLK MRK POLYPR CTRL

## (undated) DEVICE — Z INACTIVE USE 2635508 SOLUTION IRRIG 500ML 0.9% SOD CHL USP POUR PLAS BTL

## (undated) DEVICE — Device

## (undated) DEVICE — TUBING SMK EVAC L10FT OD7/8IN L BOR W/ N COND COAT

## (undated) DEVICE — GUIDEWIRE UROLOGICAL STR STD 0.035 IN X150 CM (QTY = BOX OF 5)

## (undated) DEVICE — NEEDLE SPNL 20GA L3.5IN YEL HUB S STL REG WALL FIT STYL

## (undated) DEVICE — INVIEW CLEAR LEGGINGS: Brand: CONVERTORS

## (undated) DEVICE — ELECTRODE ES L S STL RUB LEEP FISCHER CONE BX EXCISOR

## (undated) DEVICE — SHEET,DRAPE,53X77,STERILE: Brand: MEDLINE

## (undated) DEVICE — PROCTO SWABS: Brand: DEROYAL

## (undated) DEVICE — PREMIUM WET SKIN PREP TRAY: Brand: MEDLINE INDUSTRIES, INC.

## (undated) DEVICE — DRESSING,GAUZE,XEROFORM,CURAD,1"X8",ST: Brand: CURAD

## (undated) DEVICE — NITINOL STONE RETRIEVAL BASKET: Brand: ZERO TIP

## (undated) DEVICE — GUIDEWIRE ENDOSCP L150CM DIA0.035IN TIP 3CM PTFE NIT

## (undated) DEVICE — DRAPE,UNDERBUTTOCKS,PCH,STERILE: Brand: MEDLINE

## (undated) DEVICE — PENCIL ES CRD L10FT HND SWCHING ROCK SWCH W/ EDGE COAT BLDE

## (undated) DEVICE — SOLUTION IRRIG 500ML 0.9% SOD CHLO USP POUR PLAS BTL

## (undated) DEVICE — PADDING CAST W4INXL4YD NONSTERILE COT RAYON MICROPLEATED

## (undated) DEVICE — CATHETER,URETHRAL,VINYL,MALE,16",16 FR: Brand: MEDLINE

## (undated) DEVICE — SUTURE VICRYL + SZ 3-0 L27IN ABSRB UD L26MM SH 1/2 CIR VCP416H

## (undated) DEVICE — BANDAGE GZ W2XL75IN ST RAYON POLY CNFRM STRTCH LTWT